# Patient Record
Sex: FEMALE | Race: WHITE | NOT HISPANIC OR LATINO
[De-identification: names, ages, dates, MRNs, and addresses within clinical notes are randomized per-mention and may not be internally consistent; named-entity substitution may affect disease eponyms.]

---

## 2017-03-02 ENCOUNTER — MESSAGE (OUTPATIENT)
Age: 77
End: 2017-03-02

## 2017-04-13 PROBLEM — E44.0 MALNUTRITION OF MODERATE DEGREE: Status: ACTIVE | Noted: 2017-03-02

## 2017-04-14 ENCOUNTER — APPOINTMENT (OUTPATIENT)
Dept: PULMONOLOGY | Facility: CLINIC | Age: 77
End: 2017-04-14

## 2017-04-14 VITALS
TEMPERATURE: 98.9 F | BODY MASS INDEX: 25.2 KG/M2 | WEIGHT: 125 LBS | SYSTOLIC BLOOD PRESSURE: 90 MMHG | HEIGHT: 59 IN | HEART RATE: 82 BPM | OXYGEN SATURATION: 96 % | DIASTOLIC BLOOD PRESSURE: 60 MMHG

## 2017-04-14 DIAGNOSIS — E44.0 MODERATE PROTEIN-CALORIE MALNUTRITION: ICD-10-CM

## 2017-04-14 DIAGNOSIS — E07.9 DISORDER OF THYROID, UNSPECIFIED: ICD-10-CM

## 2017-04-14 DIAGNOSIS — E55.9 VITAMIN D DEFICIENCY, UNSPECIFIED: ICD-10-CM

## 2017-04-14 DIAGNOSIS — M79.606 PAIN IN LEG, UNSPECIFIED: ICD-10-CM

## 2017-05-02 ENCOUNTER — APPOINTMENT (OUTPATIENT)
Dept: HEART AND VASCULAR | Facility: CLINIC | Age: 77
End: 2017-05-02

## 2017-05-02 ENCOUNTER — LABORATORY RESULT (OUTPATIENT)
Age: 77
End: 2017-05-02

## 2017-05-02 VITALS — HEART RATE: 80 BPM | SYSTOLIC BLOOD PRESSURE: 98 MMHG | DIASTOLIC BLOOD PRESSURE: 60 MMHG

## 2017-05-02 VITALS
DIASTOLIC BLOOD PRESSURE: 62 MMHG | HEART RATE: 81 BPM | HEIGHT: 59 IN | BODY MASS INDEX: 24.49 KG/M2 | WEIGHT: 121.5 LBS | SYSTOLIC BLOOD PRESSURE: 92 MMHG

## 2017-05-02 DIAGNOSIS — D72.829 ELEVATED WHITE BLOOD CELL COUNT, UNSPECIFIED: ICD-10-CM

## 2017-05-04 LAB
BASOPHILS # BLD AUTO: 0.02 K/UL
BASOPHILS NFR BLD AUTO: 0.1 %
EOSINOPHIL # BLD AUTO: 22.17 K/UL
EOSINOPHIL NFR BLD AUTO: 75.9 %
HCT VFR BLD CALC: 39.4 %
HGB BLD-MCNC: 13 G/DL
IMM GRANULOCYTES NFR BLD AUTO: 0.3 %
LYMPHOCYTES # BLD AUTO: 1.57 K/UL
LYMPHOCYTES NFR BLD AUTO: 5.4 %
MAN DIFF?: NORMAL
MCHC RBC-ENTMCNC: 33 GM/DL
MCHC RBC-ENTMCNC: 34 PG
MCV RBC AUTO: 103.1 FL
MONOCYTES # BLD AUTO: 0.4 K/UL
MONOCYTES NFR BLD AUTO: 1.4 %
NEUTROPHILS # BLD AUTO: 4.97 K/UL
NEUTROPHILS NFR BLD AUTO: 16.9 %
PLATELET # BLD AUTO: 258 K/UL
RBC # BLD: 3.82 M/UL
RBC # FLD: 15.3 %
WBC # FLD AUTO: 29.21 K/UL

## 2017-06-01 ENCOUNTER — RESULT REVIEW (OUTPATIENT)
Age: 77
End: 2017-06-01

## 2017-06-22 ENCOUNTER — MESSAGE (OUTPATIENT)
Age: 77
End: 2017-06-22

## 2017-07-11 ENCOUNTER — APPOINTMENT (OUTPATIENT)
Dept: HEART AND VASCULAR | Facility: CLINIC | Age: 77
End: 2017-07-11

## 2017-07-17 ENCOUNTER — OUTPATIENT (OUTPATIENT)
Dept: OUTPATIENT SERVICES | Facility: HOSPITAL | Age: 77
LOS: 1 days | End: 2017-07-17
Payer: MEDICARE

## 2017-07-17 LAB
BASOPHILS NFR BLD AUTO: 0 % — SIGNIFICANT CHANGE UP (ref 0–2)
EOSINOPHIL NFR BLD AUTO: 75.8 % — HIGH (ref 0–6)
ERYTHROCYTE [SEDIMENTATION RATE] IN BLOOD: 35 MM/HR — HIGH
HCT VFR BLD CALC: 38.2 % — SIGNIFICANT CHANGE UP (ref 34.5–45)
HGB BLD-MCNC: 13.1 G/DL — SIGNIFICANT CHANGE UP (ref 11.5–15.5)
LYMPHOCYTES # BLD AUTO: 4.9 % — LOW (ref 13–44)
MCHC RBC-ENTMCNC: 34.3 G/DL — SIGNIFICANT CHANGE UP (ref 32–36)
MCHC RBC-ENTMCNC: 34.4 PG — HIGH (ref 27–34)
MCV RBC AUTO: 100.3 FL — HIGH (ref 80–100)
MONOCYTES NFR BLD AUTO: 1 % — LOW (ref 2–14)
NEUTROPHILS NFR BLD AUTO: 18.3 % — LOW (ref 43–77)
PLATELET # BLD AUTO: 257 K/UL — SIGNIFICANT CHANGE UP (ref 150–400)
RBC # BLD: 3.81 M/UL — SIGNIFICANT CHANGE UP (ref 3.8–5.2)
RBC # FLD: 15 % — SIGNIFICANT CHANGE UP (ref 10.3–16.9)
WBC # BLD: 27.1 K/UL — HIGH (ref 3.8–10.5)
WBC # FLD AUTO: 27.1 K/UL — HIGH (ref 3.8–10.5)

## 2017-07-18 LAB
EOSINOPHIL NFR BLD AUTO: 75 % — HIGH (ref 0–6)
LYMPHOCYTES # BLD AUTO: 6 % — LOW (ref 13–44)
MACROCYTES BLD QL: SLIGHT — SIGNIFICANT CHANGE UP
MANUAL DIF COMMENT BLD-IMP: SIGNIFICANT CHANGE UP
MANUAL DIF COMMENT BLD-IMP: SIGNIFICANT CHANGE UP
MANUAL SMEAR VERIFICATION: SIGNIFICANT CHANGE UP
NEUTROPHILS NFR BLD AUTO: 19 % — LOW (ref 43–77)
PLAT MORPH BLD: NORMAL — SIGNIFICANT CHANGE UP
RBC BLD AUTO: (no result)

## 2017-07-24 DIAGNOSIS — D72.1 EOSINOPHILIA: ICD-10-CM

## 2017-07-24 DIAGNOSIS — D72.829 ELEVATED WHITE BLOOD CELL COUNT, UNSPECIFIED: ICD-10-CM

## 2017-07-24 DIAGNOSIS — R63.4 ABNORMAL WEIGHT LOSS: ICD-10-CM

## 2017-08-01 ENCOUNTER — OUTPATIENT (OUTPATIENT)
Dept: OUTPATIENT SERVICES | Facility: HOSPITAL | Age: 77
LOS: 1 days | End: 2017-08-01
Payer: MEDICARE

## 2017-08-01 LAB
ERYTHROCYTE [SEDIMENTATION RATE] IN BLOOD: 14 MM/HR — SIGNIFICANT CHANGE UP
HCT VFR BLD CALC: 39.5 % — SIGNIFICANT CHANGE UP (ref 34.5–45)
HGB BLD-MCNC: 13.1 G/DL — SIGNIFICANT CHANGE UP (ref 11.5–15.5)
MCHC RBC-ENTMCNC: 33.2 G/DL — SIGNIFICANT CHANGE UP (ref 32–36)
MCHC RBC-ENTMCNC: 33.8 PG — SIGNIFICANT CHANGE UP (ref 27–34)
MCV RBC AUTO: 101.8 FL — HIGH (ref 80–100)
PLATELET # BLD AUTO: 245 K/UL — SIGNIFICANT CHANGE UP (ref 150–400)
RBC # BLD: 3.88 M/UL — SIGNIFICANT CHANGE UP (ref 3.8–5.2)
RBC # FLD: 14.7 % — SIGNIFICANT CHANGE UP (ref 10.3–16.9)
WBC # BLD: 58.9 K/UL — CRITICAL HIGH (ref 3.8–10.5)
WBC # FLD AUTO: 58.9 K/UL — CRITICAL HIGH (ref 3.8–10.5)

## 2017-08-02 LAB
ANISOCYTOSIS BLD QL: SLIGHT — SIGNIFICANT CHANGE UP
EOSINOPHIL NFR BLD AUTO: 87 % — HIGH (ref 0–6)
LYMPHOCYTES # BLD AUTO: 4 % — LOW (ref 13–44)
MACROCYTES BLD QL: SLIGHT — SIGNIFICANT CHANGE UP
MANUAL DIF COMMENT BLD-IMP: SIGNIFICANT CHANGE UP
MANUAL DIF COMMENT BLD-IMP: SIGNIFICANT CHANGE UP
MANUAL SMEAR VERIFICATION: YES — SIGNIFICANT CHANGE UP
MONOCYTES NFR BLD AUTO: 1 % — LOW (ref 2–14)
NEUTROPHILS NFR BLD AUTO: 8 % — LOW (ref 43–77)
PLAT MORPH BLD: NORMAL — SIGNIFICANT CHANGE UP
RBC BLD AUTO: (no result)
TOXIC GRANULES BLD QL SMEAR: SLIGHT — SIGNIFICANT CHANGE UP

## 2017-08-03 DIAGNOSIS — D72.1 EOSINOPHILIA: ICD-10-CM

## 2017-08-03 DIAGNOSIS — D72.829 ELEVATED WHITE BLOOD CELL COUNT, UNSPECIFIED: ICD-10-CM

## 2017-08-03 DIAGNOSIS — R63.4 ABNORMAL WEIGHT LOSS: ICD-10-CM

## 2017-08-03 DIAGNOSIS — R19.7 DIARRHEA, UNSPECIFIED: ICD-10-CM

## 2017-08-07 ENCOUNTER — RESULT REVIEW (OUTPATIENT)
Age: 77
End: 2017-08-07

## 2017-08-08 ENCOUNTER — OUTPATIENT (OUTPATIENT)
Dept: OUTPATIENT SERVICES | Facility: HOSPITAL | Age: 77
LOS: 1 days | End: 2017-08-08
Payer: MEDICARE

## 2017-08-08 DIAGNOSIS — D72.829 ELEVATED WHITE BLOOD CELL COUNT, UNSPECIFIED: ICD-10-CM

## 2017-08-08 DIAGNOSIS — D72.1 EOSINOPHILIA: ICD-10-CM

## 2017-08-08 PROCEDURE — 88341 IMHCHEM/IMCYTCHM EA ADD ANTB: CPT

## 2017-08-08 PROCEDURE — 88305 TISSUE EXAM BY PATHOLOGIST: CPT

## 2017-08-08 PROCEDURE — 88313 SPECIAL STAINS GROUP 2: CPT

## 2017-08-08 PROCEDURE — 85097 BONE MARROW INTERPRETATION: CPT

## 2017-08-10 LAB — HEMATOPATHOLOGY REPORT: SIGNIFICANT CHANGE UP

## 2017-08-14 ENCOUNTER — OTHER (OUTPATIENT)
Age: 77
End: 2017-08-14

## 2017-08-14 DIAGNOSIS — Z86.2 PERSONAL HISTORY OF DISEASES OF THE BLOOD AND BLOOD-FORMING ORGANS AND CERTAIN DISORDERS INVOLVING THE IMMUNE MECHANISM: ICD-10-CM

## 2017-08-24 ENCOUNTER — APPOINTMENT (OUTPATIENT)
Dept: PULMONOLOGY | Facility: CLINIC | Age: 77
End: 2017-08-24
Payer: MEDICARE

## 2017-08-24 ENCOUNTER — FORM ENCOUNTER (OUTPATIENT)
Age: 77
End: 2017-08-24

## 2017-08-24 VITALS
HEART RATE: 93 BPM | DIASTOLIC BLOOD PRESSURE: 60 MMHG | SYSTOLIC BLOOD PRESSURE: 110 MMHG | BODY MASS INDEX: 24.6 KG/M2 | OXYGEN SATURATION: 97 % | HEIGHT: 59 IN | WEIGHT: 122 LBS | TEMPERATURE: 98.7 F

## 2017-08-24 PROCEDURE — 94010 BREATHING CAPACITY TEST: CPT

## 2017-08-24 PROCEDURE — 99215 OFFICE O/P EST HI 40 MIN: CPT | Mod: 25

## 2017-08-25 ENCOUNTER — OUTPATIENT (OUTPATIENT)
Dept: OUTPATIENT SERVICES | Facility: HOSPITAL | Age: 77
LOS: 1 days | End: 2017-08-25
Payer: MEDICARE

## 2017-08-25 PROCEDURE — 75561 CARDIAC MRI FOR MORPH W/DYE: CPT

## 2017-08-25 PROCEDURE — A9577: CPT

## 2017-08-25 PROCEDURE — 75561 CARDIAC MRI FOR MORPH W/DYE: CPT | Mod: 26

## 2017-08-27 ENCOUNTER — TRANSCRIPTION ENCOUNTER (OUTPATIENT)
Age: 77
End: 2017-08-27

## 2017-09-29 DIAGNOSIS — D72.119 HYPEREOSINOPHILIC SYNDROME [HES], UNSPECIFIED: ICD-10-CM

## 2017-09-29 DIAGNOSIS — D64.9 ANEMIA, UNSPECIFIED: ICD-10-CM

## 2017-10-03 PROBLEM — D64.9 ANEMIA: Status: ACTIVE | Noted: 2017-10-03

## 2017-10-09 ENCOUNTER — MEDICATION RENEWAL (OUTPATIENT)
Age: 77
End: 2017-10-09

## 2018-02-13 ENCOUNTER — APPOINTMENT (OUTPATIENT)
Dept: HEART AND VASCULAR | Facility: CLINIC | Age: 78
End: 2018-02-13
Payer: MEDICARE

## 2018-02-13 VITALS
BODY MASS INDEX: 28.07 KG/M2 | WEIGHT: 139 LBS | SYSTOLIC BLOOD PRESSURE: 130 MMHG | DIASTOLIC BLOOD PRESSURE: 80 MMHG | HEART RATE: 85 BPM

## 2018-02-13 VITALS — SYSTOLIC BLOOD PRESSURE: 130 MMHG | DIASTOLIC BLOOD PRESSURE: 78 MMHG | HEART RATE: 83 BPM

## 2018-02-13 PROCEDURE — 99214 OFFICE O/P EST MOD 30 MIN: CPT | Mod: 25

## 2018-02-13 PROCEDURE — 93000 ELECTROCARDIOGRAM COMPLETE: CPT

## 2018-02-13 PROCEDURE — 93306 TTE W/DOPPLER COMPLETE: CPT

## 2018-02-26 ENCOUNTER — FORM ENCOUNTER (OUTPATIENT)
Age: 78
End: 2018-02-26

## 2018-02-27 ENCOUNTER — OUTPATIENT (OUTPATIENT)
Dept: OUTPATIENT SERVICES | Facility: HOSPITAL | Age: 78
LOS: 1 days | End: 2018-02-27
Payer: MEDICARE

## 2018-02-27 DIAGNOSIS — R53.83 OTHER FATIGUE: ICD-10-CM

## 2018-02-27 PROCEDURE — 93016 CV STRESS TEST SUPVJ ONLY: CPT

## 2018-02-27 PROCEDURE — 93018 CV STRESS TEST I&R ONLY: CPT

## 2018-02-27 PROCEDURE — 78452 HT MUSCLE IMAGE SPECT MULT: CPT

## 2018-02-27 PROCEDURE — A9500: CPT

## 2018-02-27 PROCEDURE — 93017 CV STRESS TEST TRACING ONLY: CPT

## 2018-02-27 PROCEDURE — 78452 HT MUSCLE IMAGE SPECT MULT: CPT | Mod: 26

## 2018-02-27 PROCEDURE — A9505: CPT

## 2018-04-09 ENCOUNTER — RX RENEWAL (OUTPATIENT)
Age: 78
End: 2018-04-09

## 2018-04-20 ENCOUNTER — APPOINTMENT (OUTPATIENT)
Dept: PULMONOLOGY | Facility: CLINIC | Age: 78
End: 2018-04-20
Payer: MEDICARE

## 2018-04-20 VITALS
WEIGHT: 140 LBS | SYSTOLIC BLOOD PRESSURE: 120 MMHG | HEART RATE: 107 BPM | BODY MASS INDEX: 28.22 KG/M2 | DIASTOLIC BLOOD PRESSURE: 70 MMHG | TEMPERATURE: 99.3 F | HEIGHT: 59 IN | OXYGEN SATURATION: 92 %

## 2018-04-20 PROCEDURE — 94060 EVALUATION OF WHEEZING: CPT

## 2018-04-20 PROCEDURE — 71046 X-RAY EXAM CHEST 2 VIEWS: CPT

## 2018-04-20 PROCEDURE — 99215 OFFICE O/P EST HI 40 MIN: CPT | Mod: 25

## 2018-04-20 PROCEDURE — ZZZZZ: CPT

## 2018-04-20 PROCEDURE — 94727 GAS DIL/WSHOT DETER LNG VOL: CPT

## 2018-08-08 ENCOUNTER — APPOINTMENT (OUTPATIENT)
Dept: PULMONOLOGY | Facility: CLINIC | Age: 78
End: 2018-08-08
Payer: MEDICARE

## 2018-08-08 VITALS
DIASTOLIC BLOOD PRESSURE: 74 MMHG | HEART RATE: 87 BPM | SYSTOLIC BLOOD PRESSURE: 118 MMHG | OXYGEN SATURATION: 92 % | WEIGHT: 140 LBS | TEMPERATURE: 98.7 F | BODY MASS INDEX: 28.22 KG/M2 | HEIGHT: 59 IN

## 2018-08-08 PROCEDURE — 94010 BREATHING CAPACITY TEST: CPT

## 2018-08-08 PROCEDURE — 99214 OFFICE O/P EST MOD 30 MIN: CPT | Mod: 25

## 2018-08-08 PROCEDURE — 71046 X-RAY EXAM CHEST 2 VIEWS: CPT

## 2018-08-28 ENCOUNTER — APPOINTMENT (OUTPATIENT)
Dept: INFUSION THERAPY | Facility: HOSPITAL | Age: 78
End: 2018-08-28

## 2018-08-31 ENCOUNTER — OUTPATIENT (OUTPATIENT)
Dept: OUTPATIENT SERVICES | Facility: HOSPITAL | Age: 78
LOS: 1 days | End: 2018-08-31
Payer: MEDICARE

## 2018-08-31 ENCOUNTER — APPOINTMENT (OUTPATIENT)
Dept: INFUSION THERAPY | Facility: HOSPITAL | Age: 78
End: 2018-08-31

## 2018-08-31 VITALS
TEMPERATURE: 98 F | SYSTOLIC BLOOD PRESSURE: 122 MMHG | OXYGEN SATURATION: 95 % | HEART RATE: 85 BPM | DIASTOLIC BLOOD PRESSURE: 79 MMHG | RESPIRATION RATE: 16 BRPM

## 2018-08-31 DIAGNOSIS — J45.50 SEVERE PERSISTENT ASTHMA, UNCOMPLICATED: ICD-10-CM

## 2018-08-31 PROCEDURE — 96372 THER/PROPH/DIAG INJ SC/IM: CPT

## 2018-08-31 RX ORDER — BENRALIZUMAB 30 MG/ML
30 INJECTION, SOLUTION SUBCUTANEOUS ONCE
Qty: 0 | Refills: 0 | Status: COMPLETED | OUTPATIENT
Start: 2018-08-31 | End: 2018-08-31

## 2018-08-31 RX ADMIN — BENRALIZUMAB 30 MILLIGRAM(S): 30 INJECTION, SOLUTION SUBCUTANEOUS at 13:48

## 2018-09-28 ENCOUNTER — APPOINTMENT (OUTPATIENT)
Dept: INFUSION THERAPY | Facility: HOSPITAL | Age: 78
End: 2018-09-28

## 2018-09-28 ENCOUNTER — OUTPATIENT (OUTPATIENT)
Dept: OUTPATIENT SERVICES | Facility: HOSPITAL | Age: 78
LOS: 1 days | End: 2018-09-28
Payer: MEDICARE

## 2018-09-28 VITALS
HEART RATE: 77 BPM | OXYGEN SATURATION: 99 % | SYSTOLIC BLOOD PRESSURE: 110 MMHG | DIASTOLIC BLOOD PRESSURE: 70 MMHG | RESPIRATION RATE: 18 BRPM | TEMPERATURE: 98 F

## 2018-09-28 DIAGNOSIS — J45.50 SEVERE PERSISTENT ASTHMA, UNCOMPLICATED: ICD-10-CM

## 2018-09-28 PROCEDURE — 96372 THER/PROPH/DIAG INJ SC/IM: CPT

## 2018-09-28 RX ORDER — BENRALIZUMAB 30 MG/ML
30 INJECTION, SOLUTION SUBCUTANEOUS ONCE
Qty: 0 | Refills: 0 | Status: COMPLETED | OUTPATIENT
Start: 2018-09-28 | End: 2018-09-28

## 2018-09-28 RX ADMIN — BENRALIZUMAB 30 MILLIGRAM(S): 30 INJECTION, SOLUTION SUBCUTANEOUS at 13:50

## 2018-10-26 ENCOUNTER — APPOINTMENT (OUTPATIENT)
Dept: INFUSION THERAPY | Facility: HOSPITAL | Age: 78
End: 2018-10-26

## 2018-10-26 ENCOUNTER — OUTPATIENT (OUTPATIENT)
Dept: OUTPATIENT SERVICES | Facility: HOSPITAL | Age: 78
LOS: 1 days | End: 2018-10-26
Payer: MEDICARE

## 2018-10-26 VITALS
DIASTOLIC BLOOD PRESSURE: 56 MMHG | RESPIRATION RATE: 18 BRPM | HEART RATE: 77 BPM | TEMPERATURE: 98 F | OXYGEN SATURATION: 96 % | SYSTOLIC BLOOD PRESSURE: 127 MMHG

## 2018-10-26 DIAGNOSIS — J45.50 SEVERE PERSISTENT ASTHMA, UNCOMPLICATED: ICD-10-CM

## 2018-10-26 PROCEDURE — 96372 THER/PROPH/DIAG INJ SC/IM: CPT

## 2018-10-26 RX ORDER — BENRALIZUMAB 30 MG/ML
30 INJECTION, SOLUTION SUBCUTANEOUS ONCE
Qty: 0 | Refills: 0 | Status: COMPLETED | OUTPATIENT
Start: 2018-10-26 | End: 2018-10-26

## 2018-10-26 RX ADMIN — BENRALIZUMAB 30 MILLIGRAM(S): 30 INJECTION, SOLUTION SUBCUTANEOUS at 14:37

## 2018-12-24 ENCOUNTER — APPOINTMENT (OUTPATIENT)
Dept: INFUSION THERAPY | Facility: HOSPITAL | Age: 78
End: 2018-12-24

## 2018-12-24 ENCOUNTER — OUTPATIENT (OUTPATIENT)
Dept: OUTPATIENT SERVICES | Facility: HOSPITAL | Age: 78
LOS: 1 days | End: 2018-12-24
Payer: MEDICARE

## 2018-12-24 VITALS
DIASTOLIC BLOOD PRESSURE: 78 MMHG | HEART RATE: 90 BPM | OXYGEN SATURATION: 96 % | RESPIRATION RATE: 18 BRPM | SYSTOLIC BLOOD PRESSURE: 134 MMHG | TEMPERATURE: 97 F

## 2018-12-24 DIAGNOSIS — J45.50 SEVERE PERSISTENT ASTHMA, UNCOMPLICATED: ICD-10-CM

## 2018-12-24 PROCEDURE — 96372 THER/PROPH/DIAG INJ SC/IM: CPT

## 2018-12-24 RX ORDER — BENRALIZUMAB 30 MG/ML
30 INJECTION, SOLUTION SUBCUTANEOUS ONCE
Qty: 0 | Refills: 0 | Status: COMPLETED | OUTPATIENT
Start: 2018-12-24 | End: 2018-12-24

## 2018-12-24 RX ADMIN — BENRALIZUMAB 30 MILLIGRAM(S): 30 INJECTION, SOLUTION SUBCUTANEOUS at 13:34

## 2019-02-18 ENCOUNTER — FORM ENCOUNTER (OUTPATIENT)
Age: 79
End: 2019-02-18

## 2019-02-19 ENCOUNTER — OUTPATIENT (OUTPATIENT)
Dept: OUTPATIENT SERVICES | Facility: HOSPITAL | Age: 79
LOS: 1 days | End: 2019-02-19
Payer: MEDICARE

## 2019-02-19 ENCOUNTER — APPOINTMENT (OUTPATIENT)
Dept: ORTHOPEDIC SURGERY | Facility: CLINIC | Age: 79
End: 2019-02-19
Payer: MEDICARE

## 2019-02-19 ENCOUNTER — APPOINTMENT (OUTPATIENT)
Dept: INFUSION THERAPY | Facility: HOSPITAL | Age: 79
End: 2019-02-19

## 2019-02-19 VITALS
OXYGEN SATURATION: 98 % | DIASTOLIC BLOOD PRESSURE: 80 MMHG | BODY MASS INDEX: 28.63 KG/M2 | HEIGHT: 59 IN | HEART RATE: 84 BPM | SYSTOLIC BLOOD PRESSURE: 120 MMHG | WEIGHT: 142 LBS

## 2019-02-19 VITALS
RESPIRATION RATE: 16 BRPM | HEART RATE: 88 BPM | TEMPERATURE: 99 F | OXYGEN SATURATION: 96 % | SYSTOLIC BLOOD PRESSURE: 120 MMHG | DIASTOLIC BLOOD PRESSURE: 60 MMHG

## 2019-02-19 DIAGNOSIS — J45.50 SEVERE PERSISTENT ASTHMA, UNCOMPLICATED: ICD-10-CM

## 2019-02-19 PROCEDURE — 73564 X-RAY EXAM KNEE 4 OR MORE: CPT

## 2019-02-19 PROCEDURE — 99203 OFFICE O/P NEW LOW 30 MIN: CPT

## 2019-02-19 PROCEDURE — 96372 THER/PROPH/DIAG INJ SC/IM: CPT

## 2019-02-19 PROCEDURE — 73564 X-RAY EXAM KNEE 4 OR MORE: CPT | Mod: 26,50

## 2019-02-19 RX ORDER — BENRALIZUMAB 30 MG/ML
30 INJECTION, SOLUTION SUBCUTANEOUS ONCE
Qty: 0 | Refills: 0 | Status: COMPLETED | OUTPATIENT
Start: 2019-02-19 | End: 2019-02-19

## 2019-02-19 RX ORDER — ALBUTEROL SULFATE 90 UG/1
108 (90 BASE) AEROSOL, METERED RESPIRATORY (INHALATION)
Qty: 2 | Refills: 11 | Status: COMPLETED | COMMUNITY
Start: 2017-04-14 | End: 2019-02-19

## 2019-02-19 RX ADMIN — BENRALIZUMAB 30 MILLIGRAM(S): 30 INJECTION, SOLUTION SUBCUTANEOUS at 14:31

## 2019-02-28 NOTE — DISCUSSION/SUMMARY
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 She will begin a course of supervised PT. She will slowly advance her activities as tolerated. All questions were answered. She will call if any issues arise.

## 2019-02-28 NOTE — HISTORY OF PRESENT ILLNESS
[de-identified] : Bonnie Espinal is a pleasant 79 yo woman who presents with increased anterior left knee pain. 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 After a year of steroids, she finally tapered off the medication about three weeks ago. Around the same time she noted increasing left knee pain and stiffness. She has had difficulty walking and pain with stairs. She tried a course of Gabapentin as directed by her rheumatologist, but her pain only increased. She denies any previous knee issues. She reports persistent balance and gait issues. She denies any locking or buckling.

## 2019-02-28 NOTE — END OF VISIT
[FreeTextEntry3] : All medical record entries made by ASTRID Yoder, acting as a scribe for this encounter under the direction of George Velasquez MD . I have reviewed the chart and agree that the record accurately reflects my personal performance of the history, physical exam, assessment and plan. I have also personally directed, reviewed, and agreed with the chart.

## 2019-04-02 ENCOUNTER — TRANSCRIPTION ENCOUNTER (OUTPATIENT)
Age: 79
End: 2019-04-02

## 2019-04-16 ENCOUNTER — APPOINTMENT (OUTPATIENT)
Dept: INFUSION THERAPY | Facility: HOSPITAL | Age: 79
End: 2019-04-16

## 2019-04-16 ENCOUNTER — OUTPATIENT (OUTPATIENT)
Dept: OUTPATIENT SERVICES | Facility: HOSPITAL | Age: 79
LOS: 1 days | End: 2019-04-16
Payer: MEDICARE

## 2019-04-16 VITALS
TEMPERATURE: 99 F | DIASTOLIC BLOOD PRESSURE: 67 MMHG | SYSTOLIC BLOOD PRESSURE: 149 MMHG | HEART RATE: 89 BPM | RESPIRATION RATE: 18 BRPM | OXYGEN SATURATION: 99 %

## 2019-04-16 DIAGNOSIS — J45.50 SEVERE PERSISTENT ASTHMA, UNCOMPLICATED: ICD-10-CM

## 2019-04-16 PROCEDURE — 96372 THER/PROPH/DIAG INJ SC/IM: CPT

## 2019-04-16 RX ORDER — BENRALIZUMAB 30 MG/ML
30 INJECTION, SOLUTION SUBCUTANEOUS ONCE
Qty: 0 | Refills: 0 | Status: COMPLETED | OUTPATIENT
Start: 2019-04-16 | End: 2019-04-16

## 2019-04-16 RX ADMIN — BENRALIZUMAB 30 MILLIGRAM(S): 30 INJECTION, SOLUTION SUBCUTANEOUS at 14:43

## 2019-04-17 ENCOUNTER — RX RENEWAL (OUTPATIENT)
Age: 79
End: 2019-04-17

## 2019-04-22 ENCOUNTER — APPOINTMENT (OUTPATIENT)
Dept: PULMONOLOGY | Facility: CLINIC | Age: 79
End: 2019-04-22
Payer: MEDICARE

## 2019-04-22 VITALS
OXYGEN SATURATION: 97 % | HEIGHT: 59 IN | HEART RATE: 80 BPM | DIASTOLIC BLOOD PRESSURE: 70 MMHG | BODY MASS INDEX: 28.63 KG/M2 | SYSTOLIC BLOOD PRESSURE: 130 MMHG | WEIGHT: 142 LBS | TEMPERATURE: 98.5 F

## 2019-04-22 PROCEDURE — 71046 X-RAY EXAM CHEST 2 VIEWS: CPT

## 2019-04-22 PROCEDURE — 94010 BREATHING CAPACITY TEST: CPT

## 2019-04-22 PROCEDURE — 99215 OFFICE O/P EST HI 40 MIN: CPT | Mod: 25

## 2019-04-22 NOTE — DISCUSSION/SUMMARY
[FreeTextEntry1] : she is not symptomatic from the hernia, and she worries about the risk in a person her age, and she is not wrong at all, this would be difficult surgery\par \par low spirom is likley due to the hernia\par \par she will need a bone marrown since her peripheral count are "zero or negative",  not sure what the negative means.\par \par follow up in six months and then I"ll talk to the hematologist

## 2019-04-22 NOTE — PHYSICAL EXAM
[General Appearance - Well Developed] : well developed [Normal Appearance] : normal appearance [Well Groomed] : well groomed [General Appearance - Well Nourished] : well nourished [No Deformities] : no deformities [General Appearance - In No Acute Distress] : no acute distress [Normal Conjunctiva] : the conjunctiva exhibited no abnormalities [Eyelids - No Xanthelasma] : the eyelids demonstrated no xanthelasmas [Normal Oropharynx] : normal oropharynx [Neck Appearance] : the appearance of the neck was normal [Neck Cervical Mass (___cm)] : no neck mass was observed [Jugular Venous Distention Increased] : there was no jugular-venous distention [Thyroid Nodule] : there were no palpable thyroid nodules [Thyroid Diffuse Enlargement] : the thyroid was not enlarged [Heart Rate And Rhythm] : heart rate and rhythm were normal [Heart Sounds] : normal S1 and S2 [Murmurs] : no murmurs present [Respiration, Rhythm And Depth] : normal respiratory rhythm and effort [Exaggerated Use Of Accessory Muscles For Inspiration] : no accessory muscle use [Auscultation Breath Sounds / Voice Sounds] : lungs were clear to auscultation bilaterally [FreeTextEntry1] : breath sounds are clear despite decreased spirometry [Abnormal Walk] : normal gait [Nail Clubbing] : no clubbing of the fingernails [Gait - Sufficient For Exercise Testing] : the gait was sufficient for exercise testing [Petechial Hemorrhages (___cm)] : no petechial hemorrhages [Cyanosis, Localized] : no localized cyanosis [] : no ischemic changes

## 2019-04-22 NOTE — REVIEW OF SYSTEMS
[Fatigue] : fatigue [Dyspnea] : no dyspnea [Negative] : Neurologic [FreeTextEntry6] : wekaness  and knee pain

## 2019-04-22 NOTE — HISTORY OF PRESENT ILLNESS
[FreeTextEntry1] : patient is doing well in term of her hes, but she is very weak from the prednisone with orthopedic problems  she was however not on a very high dose of prednisone.  no sure exactly what the endpoint damage of the hes was, she looked fine to me even when she had that diagnosis and was no on treatment.

## 2019-04-22 NOTE — PROCEDURE
[FreeTextEntry1] : spirometry is mostly restricted\par \par chest film shows opacity in the left lung that is her praesophageal hernial\par \par ct scan from 2018 showed that her abdominal contents are largely in her chest

## 2019-04-25 ENCOUNTER — APPOINTMENT (OUTPATIENT)
Dept: ORTHOPEDIC SURGERY | Facility: CLINIC | Age: 79
End: 2019-04-25
Payer: MEDICARE

## 2019-04-25 VITALS
WEIGHT: 138 LBS | HEART RATE: 73 BPM | BODY MASS INDEX: 27.82 KG/M2 | DIASTOLIC BLOOD PRESSURE: 70 MMHG | HEIGHT: 59 IN | SYSTOLIC BLOOD PRESSURE: 120 MMHG | OXYGEN SATURATION: 97 %

## 2019-04-25 PROCEDURE — 20610 DRAIN/INJ JOINT/BURSA W/O US: CPT | Mod: LT

## 2019-04-25 PROCEDURE — 99213 OFFICE O/P EST LOW 20 MIN: CPT | Mod: 25

## 2019-05-02 NOTE — PHYSICAL EXAM
[ALL] : dorsalis pedis, posterior tibial, femoral, popliteal, and radial 2+ and symmetric bilaterally [de-identified] : The patient is a well developed, well nourished female in no apparent distress. She is alert and oriented X 3 with a pleasant mood and appropriate affect. \par \par On physical examination of the left knee, there is full range of motion. The patient walks with a slight limpot and stands in neutral alignment. There is trace effusion. No warmth or erythema is noted. The patella is tender to palpation medially and laterally. There is patellofemoral crepitus noted. The apprehension and grind tests are negative. The extensor mechanism is intact. There is no joint line tenderness. The Carin sign is absent. The Lachman and pivot shift tests are negative. There is no varus or valgus laxity at 0 or 30 degrees. No posterolateral or anteromedial laxity is noted. No masses are palpable. No other soft tissue or bony tenderness is noted. There is some tenderness noted on palpation of the IT band. Quadriceps weakness is noted. Neurovascular function is intact.   [Normal] : Oriented to person, place, and time, insight and judgement were intact and the affect was normal

## 2019-05-02 NOTE — PROCEDURE
[de-identified] : Under strict sterile technique, the left t knee was prepped with Betadine. Using the superolateral approach, with the patient supine, 1ml of Kenalog was mixed with 5mls of 1% Lidocaine and 5mLs of 0.5% Marcaine, and was injected intraarticularly. The patient tolerated the procedure well. The patient was instructed to avoid vigorous exercise for 24 hours and will apply ice to the knee for 20 minutes 2-3 times per day if discomfort occurs. Patient will return on an as needed basis. The patient will call if any questions or problems should arise

## 2019-05-02 NOTE — REVIEW OF SYSTEMS
[Joint Pain] : joint pain [Joint Stiffness] : joint stiffness [Joint Swelling] : joint swelling [Negative] : Psychiatric

## 2019-05-02 NOTE — DISCUSSION/SUMMARY
[de-identified] : Ms. Leal received a cortisone injection today. She will slowly increase her activities as tolerated. She will return to PT next week. All questions were answered. She will call if any issues arise.

## 2019-05-02 NOTE — HISTORY OF PRESENT ILLNESS
[de-identified] : Bonnie returns today for evaluation of her left knee. She had been making progress in PT until about a week ago. She developed increased anterior knee pain and swelling. She has difficulty walking and pain with stairs. She denies any locking or buckling.

## 2019-05-13 ENCOUNTER — RX RENEWAL (OUTPATIENT)
Age: 79
End: 2019-05-13

## 2019-05-14 ENCOUNTER — RX RENEWAL (OUTPATIENT)
Age: 79
End: 2019-05-14

## 2019-06-11 ENCOUNTER — OUTPATIENT (OUTPATIENT)
Dept: OUTPATIENT SERVICES | Facility: HOSPITAL | Age: 79
LOS: 1 days | End: 2019-06-11
Payer: MEDICARE

## 2019-06-11 ENCOUNTER — APPOINTMENT (OUTPATIENT)
Dept: INFUSION THERAPY | Facility: HOSPITAL | Age: 79
End: 2019-06-11

## 2019-06-11 VITALS
OXYGEN SATURATION: 95 % | TEMPERATURE: 97 F | HEART RATE: 77 BPM | RESPIRATION RATE: 18 BRPM | DIASTOLIC BLOOD PRESSURE: 66 MMHG | SYSTOLIC BLOOD PRESSURE: 104 MMHG

## 2019-06-11 DIAGNOSIS — J45.50 SEVERE PERSISTENT ASTHMA, UNCOMPLICATED: ICD-10-CM

## 2019-06-11 PROCEDURE — 96372 THER/PROPH/DIAG INJ SC/IM: CPT

## 2019-06-11 RX ORDER — BENRALIZUMAB 30 MG/ML
30 INJECTION, SOLUTION SUBCUTANEOUS ONCE
Refills: 0 | Status: COMPLETED | OUTPATIENT
Start: 2019-06-11 | End: 2019-06-11

## 2019-06-11 RX ADMIN — BENRALIZUMAB 30 MILLIGRAM(S): 30 INJECTION, SOLUTION SUBCUTANEOUS at 14:10

## 2019-08-07 ENCOUNTER — APPOINTMENT (OUTPATIENT)
Age: 79
End: 2019-08-07

## 2019-08-07 ENCOUNTER — OUTPATIENT (OUTPATIENT)
Dept: OUTPATIENT SERVICES | Facility: HOSPITAL | Age: 79
LOS: 1 days | End: 2019-08-07
Payer: MEDICARE

## 2019-08-07 VITALS
OXYGEN SATURATION: 96 % | DIASTOLIC BLOOD PRESSURE: 70 MMHG | HEIGHT: 58 IN | WEIGHT: 138.01 LBS | SYSTOLIC BLOOD PRESSURE: 108 MMHG | RESPIRATION RATE: 18 BRPM | HEART RATE: 79 BPM | TEMPERATURE: 99 F

## 2019-08-07 DIAGNOSIS — J45.50 SEVERE PERSISTENT ASTHMA, UNCOMPLICATED: ICD-10-CM

## 2019-08-07 PROCEDURE — 96372 THER/PROPH/DIAG INJ SC/IM: CPT

## 2019-08-07 RX ORDER — BENRALIZUMAB 30 MG/ML
30 INJECTION, SOLUTION SUBCUTANEOUS ONCE
Refills: 0 | Status: COMPLETED | OUTPATIENT
Start: 2019-08-07 | End: 2019-08-07

## 2019-08-07 RX ADMIN — BENRALIZUMAB 30 MILLIGRAM(S): 30 INJECTION, SOLUTION SUBCUTANEOUS at 14:25

## 2019-09-24 ENCOUNTER — APPOINTMENT (OUTPATIENT)
Dept: PULMONOLOGY | Facility: CLINIC | Age: 79
End: 2019-09-24
Payer: MEDICARE

## 2019-09-24 VITALS
SYSTOLIC BLOOD PRESSURE: 124 MMHG | OXYGEN SATURATION: 93 % | BODY MASS INDEX: 27.82 KG/M2 | DIASTOLIC BLOOD PRESSURE: 70 MMHG | WEIGHT: 138 LBS | HEART RATE: 94 BPM | TEMPERATURE: 98.6 F | HEIGHT: 59 IN

## 2019-09-24 PROCEDURE — 71046 X-RAY EXAM CHEST 2 VIEWS: CPT

## 2019-09-24 PROCEDURE — 99214 OFFICE O/P EST MOD 30 MIN: CPT | Mod: 25

## 2019-09-24 NOTE — PHYSICAL EXAM
[General Appearance - Well Developed] : well developed [Normal Appearance] : normal appearance [General Appearance - Well Nourished] : well nourished [Normal Conjunctiva] : the conjunctiva exhibited no abnormalities [II] : II [Normal Oropharynx] : normal oropharynx [Neck Appearance] : the appearance of the neck was normal [Jugular Venous Distention Increased] : there was no jugular-venous distention [Heart Rate And Rhythm] : heart rate and rhythm were normal [Heart Sounds] : normal S1 and S2 [Murmurs] : no murmurs present [Respiration, Rhythm And Depth] : normal respiratory rhythm and effort [Auscultation Breath Sounds / Voice Sounds] : lungs were clear to auscultation bilaterally [Abdomen Soft] : soft [Abdomen Tenderness] : non-tender [Abnormal Walk] : normal gait [Nail Clubbing] : no clubbing of the fingernails [Cyanosis, Localized] : no localized cyanosis [] : no rash [Skin Lesions] : no skin lesions

## 2019-09-26 NOTE — REVIEW OF SYSTEMS
[Fatigue] : fatigue [Nasal Congestion] : nasal congestion [Nasal Discharge] : nasal discharge [Fever] : no fever [Chills] : no chills [Postnasal Drip] : no postnasal drip [Sinus Problems] : no sinus problems [Cough] : no cough [Sputum] : not coughing up ~M sputum [Dyspnea] : no dyspnea [Chest Tightness] : no chest tightness [Pleuritic Pain] : no pleuritic pain [Hives] : no urticaria was observed [Angioedema] : no angioedema

## 2019-09-26 NOTE — ASSESSMENT
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 Jordan\par - Asthma well controlled, mild viral like illness today\par - Suspect worsening of FEV1 and FVC are due to the large hiatal hernia, referral given to Dr. Ortiz for evaluation of whether she is a candidate for intervention.

## 2019-09-26 NOTE — HISTORY OF PRESENT ILLNESS
[FreeTextEntry1] : Hypereosinophilia syndrome on Fesenra, presents for follow up. Complains of mild nasal congestion, but no SOB or cough, but feels fatigued since the last few days. No fever, chill, chest pain. Eosinophils last checked 4 months ago and WNL. Using flovent BID and arcapta daily.

## 2019-10-02 ENCOUNTER — OUTPATIENT (OUTPATIENT)
Dept: OUTPATIENT SERVICES | Facility: HOSPITAL | Age: 79
LOS: 1 days | End: 2019-10-02
Payer: MEDICARE

## 2019-10-02 ENCOUNTER — APPOINTMENT (OUTPATIENT)
Age: 79
End: 2019-10-02

## 2019-10-02 VITALS
RESPIRATION RATE: 18 BRPM | DIASTOLIC BLOOD PRESSURE: 65 MMHG | SYSTOLIC BLOOD PRESSURE: 93 MMHG | HEIGHT: 58 IN | HEART RATE: 79 BPM | WEIGHT: 139.99 LBS | OXYGEN SATURATION: 97 % | TEMPERATURE: 98 F

## 2019-10-02 DIAGNOSIS — J45.50 SEVERE PERSISTENT ASTHMA, UNCOMPLICATED: ICD-10-CM

## 2019-10-02 PROCEDURE — 96372 THER/PROPH/DIAG INJ SC/IM: CPT

## 2019-10-02 RX ORDER — BENRALIZUMAB 30 MG/ML
30 INJECTION, SOLUTION SUBCUTANEOUS ONCE
Refills: 0 | Status: COMPLETED | OUTPATIENT
Start: 2019-10-02 | End: 2019-10-02

## 2019-10-02 RX ADMIN — BENRALIZUMAB 30 MILLIGRAM(S): 30 INJECTION, SOLUTION SUBCUTANEOUS at 13:45

## 2019-10-04 ENCOUNTER — RX RENEWAL (OUTPATIENT)
Age: 79
End: 2019-10-04

## 2019-10-15 ENCOUNTER — APPOINTMENT (OUTPATIENT)
Dept: ORTHOPEDIC SURGERY | Facility: CLINIC | Age: 79
End: 2019-10-15
Payer: MEDICARE

## 2019-10-15 VITALS
HEIGHT: 59 IN | HEART RATE: 73 BPM | SYSTOLIC BLOOD PRESSURE: 120 MMHG | DIASTOLIC BLOOD PRESSURE: 70 MMHG | BODY MASS INDEX: 27.82 KG/M2 | WEIGHT: 138 LBS | OXYGEN SATURATION: 98 %

## 2019-10-15 PROCEDURE — 20610 DRAIN/INJ JOINT/BURSA W/O US: CPT | Mod: LT

## 2019-10-15 PROCEDURE — 99213 OFFICE O/P EST LOW 20 MIN: CPT | Mod: 25

## 2019-10-16 NOTE — END OF VISIT
[FreeTextEntry3] : All medical record entries made by ASTRID Yoder, acting as a scribe for this encounter under the direction of George Velasquez MD . I have reviewed the chart and agree that the record accurately reflects my personal performance of the history, physical exam, assessment and plan. I have also personally directed, reviewed, and agreed with the chart. \par \par

## 2019-10-16 NOTE — DISCUSSION/SUMMARY
[de-identified] : Bonnie received a cortisone injection today. She will restart Mobic 15mg daily for the next two weeks. She will begin a new round of supervised PT. she will return on an as needed basis. All question were answered. She will call if any issues arise.

## 2019-10-16 NOTE — HISTORY OF PRESENT ILLNESS
[de-identified] : Bonnie returns today for evaluation of her left knee. She reports a recent increase in anteiror knee pain and stiffness. She hit a plateau in PT and has been unable to exercise due to pain. She has had pain when rising from a seated position and pain with stairs. She denies any locking or buckling.

## 2019-10-16 NOTE — PROCEDURE
[de-identified] : Under strict sterile technique, the left  knee was prepped with Betadine. Using the superolateral approach, with the patient supine, 1ml of Kenalog was mixed with 5mls of 1% Lidocaine and 5mLs of 0.5% Marcaine, and was injected intraarticularly. The patient tolerated the procedure well. The patient was instructed to avoid vigorous exercise for 24 hours and will apply ice to the knee for 20 minutes 2-3 times per day if discomfort occurs. Patient will return on an as needed basis. The patient will call if any questions or problems should arise

## 2019-10-16 NOTE — PHYSICAL EXAM
[ALL] : dorsalis pedis, posterior tibial, femoral, popliteal, and radial 2+ and symmetric bilaterally [Normal] : Oriented to person, place, and time, insight and judgement were intact and the affect was normal [de-identified] : The patient is a well developed, well nourished female in no apparent distress. She is alert and oriented X 3 with a pleasant mood and appropriate affect. \par \par On physical examination of the left knee, there is full range of motion. The patient walks with a slight limp  and stands in neutral alignment. There is trace effusion. No warmth or erythema is noted. The patella is tender to palpation medially and laterally. There is patellofemoral crepitus noted. The apprehension and grind tests are negative. The extensor mechanism is intact. There is no joint line tenderness. The Carin sign is absent. The Lachman and pivot shift tests are negative. There is no varus or valgus laxity at 0 or 30 degrees. No posterolateral or anteromedial laxity is noted. No masses are palpable. No other soft tissue or bony tenderness is noted. There is some tenderness noted on palpation of the IT band. Quadriceps weakness is noted. Neurovascular function is intact.

## 2019-11-14 ENCOUNTER — RX RENEWAL (OUTPATIENT)
Age: 79
End: 2019-11-14

## 2019-11-26 ENCOUNTER — OUTPATIENT (OUTPATIENT)
Dept: OUTPATIENT SERVICES | Facility: HOSPITAL | Age: 79
LOS: 1 days | End: 2019-11-26
Payer: MEDICARE

## 2019-11-26 ENCOUNTER — APPOINTMENT (OUTPATIENT)
Dept: ORTHOPEDIC SURGERY | Facility: CLINIC | Age: 79
End: 2019-11-26
Payer: MEDICARE

## 2019-11-26 ENCOUNTER — APPOINTMENT (OUTPATIENT)
Age: 79
End: 2019-11-26

## 2019-11-26 VITALS
WEIGHT: 138.89 LBS | TEMPERATURE: 99 F | DIASTOLIC BLOOD PRESSURE: 75 MMHG | SYSTOLIC BLOOD PRESSURE: 122 MMHG | HEART RATE: 80 BPM | HEIGHT: 58 IN | OXYGEN SATURATION: 97 % | RESPIRATION RATE: 16 BRPM

## 2019-11-26 VITALS
WEIGHT: 137 LBS | SYSTOLIC BLOOD PRESSURE: 120 MMHG | HEIGHT: 59 IN | DIASTOLIC BLOOD PRESSURE: 70 MMHG | OXYGEN SATURATION: 98 % | HEART RATE: 103 BPM | BODY MASS INDEX: 27.62 KG/M2

## 2019-11-26 DIAGNOSIS — J45.50 SEVERE PERSISTENT ASTHMA, UNCOMPLICATED: ICD-10-CM

## 2019-11-26 PROCEDURE — 96372 THER/PROPH/DIAG INJ SC/IM: CPT

## 2019-11-26 PROCEDURE — 20610 DRAIN/INJ JOINT/BURSA W/O US: CPT | Mod: LT

## 2019-11-26 PROCEDURE — 99213 OFFICE O/P EST LOW 20 MIN: CPT | Mod: 25

## 2019-11-26 RX ORDER — BENRALIZUMAB 30 MG/ML
30 INJECTION, SOLUTION SUBCUTANEOUS ONCE
Refills: 0 | Status: COMPLETED | OUTPATIENT
Start: 2019-11-26 | End: 2019-11-26

## 2019-11-26 RX ADMIN — BENRALIZUMAB 30 MILLIGRAM(S): 30 INJECTION, SOLUTION SUBCUTANEOUS at 13:20

## 2019-12-05 NOTE — HISTORY OF PRESENT ILLNESS
[de-identified] : Bonnie returns today for evaluation of her left knee. She had only minor temporary improvement following the cortisone injection. She has persistent anteiror knee pain and stiffness. she has limited walking tolerance and ongoing pain with stairs. She denies any locking or buckling. She has been unable to resume PT due to ongoing pain.

## 2019-12-05 NOTE — DISCUSSION/SUMMARY
[de-identified] : Bonnie has had persistent knee pain and stiffness. She received a Monovisc injection today. She will slowly increase her activities as tolerated. We will see her back on an as needed basis. She will call if any issues arise.

## 2019-12-05 NOTE — PROCEDURE
[de-identified] : Under strict sterile technique, the left knee was prepped with Betadine. Using the superolateral approach, with the patient supine, a 4mL injection of Monovisc was administered intra-articularly. The patient tolerated the procedure well. The patient was instructed to avoid vigorous exercise for 48 hours and will apply ice to the knee for 20 minutes 2-3 times per day if discomfort occurs. Patient will return on an as needed basis. The patient will call if any questions or problems should arise. \par \par \par \par \par MonoVisc injection - Left knee joint\par Lot #: [id number]\par Exp: 04-\par Man: [employer]\par NDC: 46589-41106-1

## 2019-12-05 NOTE — PHYSICAL EXAM
[ALL] : dorsalis pedis, posterior tibial, femoral, popliteal, and radial 2+ and symmetric bilaterally [Normal] : Alert and in no acute distress [de-identified] : The patient is a well developed, well nourished female in no apparent distress. She is alert and oriented X 3 with a pleasant mood and appropriate affect. \par \par On physical examination of the left knee, there is full range of motion. The patient walks with a slight limp  and stands in neutral alignment. There is trace effusion. No warmth or erythema is noted. The patella is tender to palpation medially and laterally. There is patellofemoral crepitus noted. The apprehension and grind tests are negative. The extensor mechanism is intact. There is no joint line tenderness. The Carin sign is absent. The Lachman and pivot shift tests are negative. There is no varus or valgus laxity at 0 or 30 degrees. No posterolateral or anteromedial laxity is noted. No masses are palpable. No other soft tissue or bony tenderness is noted. There is some tenderness noted on palpation of the IT band. Quadriceps weakness is noted. Neurovascular function is intact.

## 2019-12-11 DIAGNOSIS — J45.909 UNSPECIFIED ASTHMA, UNCOMPLICATED: ICD-10-CM

## 2019-12-19 ENCOUNTER — APPOINTMENT (OUTPATIENT)
Dept: THORACIC SURGERY | Facility: CLINIC | Age: 79
End: 2019-12-19
Payer: MEDICARE

## 2019-12-19 VITALS
OXYGEN SATURATION: 95 % | BODY MASS INDEX: 26.61 KG/M2 | HEART RATE: 73 BPM | RESPIRATION RATE: 18 BRPM | HEIGHT: 59 IN | DIASTOLIC BLOOD PRESSURE: 63 MMHG | TEMPERATURE: 97.3 F | SYSTOLIC BLOOD PRESSURE: 124 MMHG | WEIGHT: 132 LBS

## 2019-12-19 PROCEDURE — 99203 OFFICE O/P NEW LOW 30 MIN: CPT

## 2019-12-20 NOTE — ADDENDUM
[FreeTextEntry1] : Documented by Shauna Ramirez acting as a scribe for Dr. Valdez Ortiz on 12/19/2019\par

## 2019-12-20 NOTE — HISTORY OF PRESENT ILLNESS
[FreeTextEntry1] : 78 y/o female, nonsmoker with a PMH of hypereosinophilic syndrome, CABG in 1999, hiatal hernia, asthma. She is being referred by Dr. Lees for surgical evaluation of her hiatal hernia. \par \par CT chest completed on 04/30/19:\par -large hiatal hernia with intrathoracic stomach demonstrating stable organoaxial volvulus, stable non obstructed transverse colon and mesenteric fat.\par -no evidence of gastric outlet obstruction\par -two stable subcentimeter pulmonary nodules\par -stable mildly dilated main pulmonary artery trunk, suggesting mild pulmonary arterial hypertension\par -significant coronary artery calcifications\par \par Spirometry done on 09/24/19 showed FEV1 = 0.73, 45% of predicted value, FVC = 0.97, 44% of predicted value, PEF = 2.61, 59% of predicted value \par

## 2019-12-20 NOTE — ASSESSMENT
[FreeTextEntry1] : 80 y/o female, nonsmoker with large hiatal hernia seen on CT chest. Patient currently modifies her diet by decreasing her portion sizes. She does admit to indigestion after eating, but this resolves after fifteen minuets as per patient. She also reports moderate hoarseness to her voice and her Rheumatologist said this may be related to underlying reflux. She denies chest pain, SOB, nausea or vomiting. She does admit to constipation for the past week, but admits this as out of the ordinary.  \par \par Patient does not feel that these symptoms are intruding on her life enough to go through surgery. She is extremely anxious about the potential for undergoing surgery. She explains that her sister passed after undergoing a cardiac surgery and she stressed that she does not have the social support with family or friends to help her post-operatively. Patient was crying and anxious during this office visit. \par \par I reviewed her recent CT chest from 4/30/19 with the patient. There is a large, Type IV hiatal hernia noted with the stomach and colon present in the chest. There is also large amounts of stool noted in the colon. I explained that due to the lapse in time I recommended a repeat CT chest and abdomen as well as barium esophagram. She is instructed to go to the ER for immediate evaluation if she develops abdominal pain, prolonged constipation, nausea or vomiting as this may be a sign of a SBO that will require emergency repair.  She was instructed to go immediately to the emergency room if symptoms develop.\par \par I also discussed with the patient that emergency surgery will come with a higher risk vs planned surgery. The planned surgery was discussed with the patient which entails an EGD, robotic-assisted, laparoscopic, hiatal hernia repair with partial fundoplication. \par \par The patient was counseled on the risks, benefits and alternatives of proceeding with hiatal hernia surgery.  Specifically, the risk of bleeding, need for a blood transfusion, conversion to an open procedure, solid organ injury, hollow viscus injury, possible need for mesh repair, possible need for fundoplication, possible need for gastropexy, as well as the risk of dysphagia, odynophagia, nausea, vomiting, delayed gastric emptying, DVT, pulmonary embolus, recurrent hernia, need for dietary modification, need for repeat surgery, as well as dysrhythmia, myocardial infarction and mortality was discussed with the patient, who understands and agrees to the above.\par \par Patient will return after the CT chest/abdomen as well as the esophagram to determine the plan of care. \par \par I have reviewed the patient's medical records and diagnostic images at the time of this office consultation and have made the following recommendation.\par Plan:\par 1. Barium esophagram\par 2. CT Chest and abdomen\par 3. RTO after above.

## 2019-12-20 NOTE — PHYSICAL EXAM
[General Appearance - Alert] : alert [] : no respiratory distress [Respiration, Rhythm And Depth] : normal respiratory rhythm and effort [Auscultation Breath Sounds / Voice Sounds] : lungs were clear to auscultation bilaterally [Apical Impulse] : the apical impulse was normal [Exaggerated Use Of Accessory Muscles For Inspiration] : no accessory muscle use [Heart Rate And Rhythm] : heart rate was normal and rhythm regular [Heart Sounds] : normal S1 and S2 [2+] : left 2+ [Abnormal Walk] : normal gait [Oriented To Time, Place, And Person] : oriented to person, place, and time

## 2019-12-20 NOTE — END OF VISIT
[FreeTextEntry3] : All medical record entries made by the Scribe were at my, Dr. Ortiz's direction and personally dictated by me on 12/19/2019 . I have reviewed the chart and agree that the record accurately reflects my personal performance of the history, physical exam, assessment and plan. I have also personally directed, reviewed, and agreed with the chart.\par

## 2020-01-21 ENCOUNTER — APPOINTMENT (OUTPATIENT)
Age: 80
End: 2020-01-21

## 2020-01-21 ENCOUNTER — OUTPATIENT (OUTPATIENT)
Dept: OUTPATIENT SERVICES | Facility: HOSPITAL | Age: 80
LOS: 1 days | End: 2020-01-21
Payer: MEDICARE

## 2020-01-21 VITALS
WEIGHT: 136.91 LBS | SYSTOLIC BLOOD PRESSURE: 147 MMHG | HEIGHT: 58 IN | DIASTOLIC BLOOD PRESSURE: 68 MMHG | TEMPERATURE: 97 F | RESPIRATION RATE: 16 BRPM | OXYGEN SATURATION: 98 % | HEART RATE: 75 BPM

## 2020-01-21 DIAGNOSIS — J45.909 UNSPECIFIED ASTHMA, UNCOMPLICATED: ICD-10-CM

## 2020-01-21 DIAGNOSIS — J45.50 SEVERE PERSISTENT ASTHMA, UNCOMPLICATED: ICD-10-CM

## 2020-01-21 PROCEDURE — 96372 THER/PROPH/DIAG INJ SC/IM: CPT

## 2020-01-21 RX ORDER — BENRALIZUMAB 30 MG/ML
30 INJECTION, SOLUTION SUBCUTANEOUS ONCE
Refills: 0 | Status: COMPLETED | OUTPATIENT
Start: 2020-01-21 | End: 2020-01-21

## 2020-01-21 RX ADMIN — BENRALIZUMAB 30 MILLIGRAM(S): 30 INJECTION, SOLUTION SUBCUTANEOUS at 14:29

## 2020-01-23 ENCOUNTER — APPOINTMENT (OUTPATIENT)
Dept: THORACIC SURGERY | Facility: CLINIC | Age: 80
End: 2020-01-23
Payer: MEDICARE

## 2020-01-23 VITALS
TEMPERATURE: 96.5 F | WEIGHT: 138 LBS | OXYGEN SATURATION: 94 % | SYSTOLIC BLOOD PRESSURE: 148 MMHG | HEIGHT: 59 IN | HEART RATE: 87 BPM | RESPIRATION RATE: 18 BRPM | BODY MASS INDEX: 27.82 KG/M2 | DIASTOLIC BLOOD PRESSURE: 70 MMHG

## 2020-01-23 PROCEDURE — 99214 OFFICE O/P EST MOD 30 MIN: CPT

## 2020-01-23 NOTE — PHYSICAL EXAM
[Sclera] : the sclera and conjunctiva were normal [PERRL With Normal Accommodation] : pupils were equal in size, round, and reactive to light [Neck Appearance] : the appearance of the neck was normal [] : no respiratory distress [Respiration, Rhythm And Depth] : normal respiratory rhythm and effort [Examination Of The Chest] : the chest was normal in appearance [2+] : right 2+ [Bowel Sounds] : normal bowel sounds [Abdomen Soft] : soft [Abnormal Walk] : normal gait [Musculoskeletal - Swelling] : no joint swelling seen [Skin Turgor] : normal skin turgor [Skin Color & Pigmentation] : normal skin color and pigmentation [Oriented To Time, Place, And Person] : oriented to person, place, and time [Impaired Insight] : insight and judgment were intact

## 2020-02-04 RX ORDER — PREDNISONE 20 MG/1
20 TABLET ORAL DAILY
Qty: 10 | Refills: 0 | Status: COMPLETED | COMMUNITY
Start: 2019-10-04 | End: 2020-02-04

## 2020-02-04 RX ORDER — MELOXICAM 15 MG/1
15 TABLET ORAL
Qty: 30 | Refills: 0 | Status: COMPLETED | COMMUNITY
Start: 2019-04-17 | End: 2020-02-04

## 2020-02-04 RX ORDER — MELOXICAM 15 MG/1
15 TABLET ORAL
Qty: 30 | Refills: 2 | Status: COMPLETED | COMMUNITY
Start: 2019-10-15 | End: 2020-02-04

## 2020-02-05 NOTE — END OF VISIT
[FreeTextEntry3] : All the medical record entries made by the Scribe were at my, Dr. Ortiz's direction and personally dictated by me on 01/23/2020. I have reviewed the chart and agree that the record accurately reflects my personal performance of the history, physical exam, assessment, and plan. I have also personally directed, reviewed and agreed with the chart.

## 2020-02-05 NOTE — ASSESSMENT
[FreeTextEntry1] : 78 y/o female, nonsmoker with a PMH of hypereosinophilic syndrome, CABG in 1999, hiatal hernia, asthma. She presents today to discuss the results of her recent barium esophagram and CT chest/abdomen. \par \par Patient is doing generally well. She denies pain, reflux, heartburn, and food sticking, but consumes small portions. She reports epigastric discomfort when eating larger meals. 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 We also reviewed patient's CT chest/abdomen completed on 12/3020 which demonstrates a large hiatal hernia. \par \par After reviewing all patient's testing, imaging, and notes from other physicians, I recommend patient undergo a EGD, laparopscopy, robotic-assisted, hiatal hernia repair with partial fundoplication, possible mesh and possible gastropexy. I explained to patient that if she does not repair the hernia, she will be at risk for  strangulation, and may need to undergo an emergency hernia repair which will carry greater risk then a scheduled repair. Patient is nervous about the operation and would like to consider it and let us know her decision. If she elects to have the surgery, she would like an anxiolytic morning of surgery.\par \par The patient was counseled on the risks, benefits and alternatives of proceeding with hiatal hernia surgery.  Specifically, the risk of bleeding, need for a blood transfusion, conversion to an open procedure, solid organ injury, hollow viscus injury, bloating, pain, dyspnea, tachycardia, as well as the risk of dysphagia, odynophagia, nausea, vomiting, delayed gastric emptying, DVT, pulmonary embolus, recurrent hernia, need for dietary modification, need for repeat surgery, as well as dysrhythmia, myocardial infarction and mortality was discussed with the patient, who understands and agrees to the above. I will speak to patient's cardiologist, Dr. Caitlyn Quigley, about moving forward with this operation.\par \par She is instructed to go to the ER for immediate evaluation if she develops abdominal pain, prolonged constipation, nausea or vomiting as this may be a sign of a SBO that will require emergency repair. \par \par I have reviewed the patient's medical records and diagnostic images at the time of this office consultation and have made the following recommendation.\par \par Plan:\par 1. Patient will consider hiatal hernia repair and then contact the office\par 2. Will schedule hiatal hernia repair, pending patient confirmation ( she will need medical and cardiac clearance, PST labs).

## 2020-02-05 NOTE — ADDENDUM
[FreeTextEntry1] : Documented by Franklin Chew acting as a scribe for Dr. Valdez Ortiz on 01/23/2020.\par \par

## 2020-02-05 NOTE — HISTORY OF PRESENT ILLNESS
[FreeTextEntry1] : 80 y/o female, nonsmoker with a PMH of hypereosinophilic syndrome, CABG in 1999, hiatal hernia, asthma. She presents today to discuss the results of her recent barium esophagram and CT chest/abdomen. She was referred by Dr. Lees for surgical evaluation of her hiatal hernia. \par \par CT chest completed on 04/30/19:\par -large hiatal hernia with intrathoracic stomach demonstrating stable organoaxial volvulus, stable non obstructed transverse colon and mesenteric fat.\par -no evidence of gastric outlet obstruction\par -two stable subcentimeter pulmonary nodules\par -stable mildly dilated main pulmonary artery trunk, suggesting mild pulmonary arterial hypertension\par -significant coronary artery calcifications\par \par Spirometry done on 09/24/19 showed FEV1 = 0.73, 45% of predicted value, FVC = 0.97, 44% of predicted value, PEF = 2.61, 59% of predicted value \par \par Barium Esophagram 01/07/20:\par -GERD to the upper esophagus in the BRAVO position\par -diffuse tertiary contractions are visualized in the esophagus most distally\par -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 \par \par CT chest/abdomen completed on 12/30/19:\par -hiatal hernia, large in size without significant interval change

## 2020-02-24 VITALS
HEIGHT: 59 IN | SYSTOLIC BLOOD PRESSURE: 148 MMHG | TEMPERATURE: 96 F | DIASTOLIC BLOOD PRESSURE: 70 MMHG | WEIGHT: 138.01 LBS | OXYGEN SATURATION: 98 % | RESPIRATION RATE: 18 BRPM | HEART RATE: 87 BPM

## 2020-02-24 RX ORDER — IRBESARTAN 75 MG/1
1 TABLET ORAL
Qty: 0 | Refills: 0 | DISCHARGE

## 2020-02-24 RX ORDER — SERTRALINE 25 MG/1
0 TABLET, FILM COATED ORAL
Qty: 0 | Refills: 0 | DISCHARGE

## 2020-02-24 RX ORDER — EZETIMIBE 10 MG/1
1 TABLET ORAL
Qty: 0 | Refills: 0 | DISCHARGE

## 2020-02-24 RX ORDER — ROSUVASTATIN CALCIUM 5 MG/1
1 TABLET ORAL
Qty: 0 | Refills: 0 | DISCHARGE

## 2020-02-24 NOTE — H&P ADULT - NSHPLABSRESULTS_GEN_ALL_CORE
CT chest completed on 04/30/19:  -large hiatal hernia with intrathoracic stomach demonstrating stable organoaxial volvulus, stable non obstructed transverse colon and mesenteric fat.  -no evidence of gastric outlet obstruction  -two stable subcentimeter pulmonary nodules  -stable mildly dilated main pulmonary artery trunk, suggesting mild pulmonary arterial hypertension  -significant coronary artery calcifications    Spirometry done on 09/24/19 showed FEV1 = 0.73, 45% of predicted value, FVC = 0.97, 44% of predicted value, PEF = 2.61, 59% of predicted value     CT chest/abdomen completed on 12/30/19:  -hiatal hernia, large in size without significant interval change     Barium Esophagram 01/07/20:  -GERD to the upper esophagus in the BRAVO position  -diffuse tertiary contractions are visualized in the esophagus most distally  -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

## 2020-02-24 NOTE — PATIENT PROFILE ADULT - VISION (WITH CORRECTIVE LENSES IF THE PATIENT USUALLY WEARS THEM):
Normal vision: sees adequately in most situations; can see medication labels, newsprint Normal vision: sees adequately in most situations; can see medication labels, newsprint/distant glasses

## 2020-02-24 NOTE — H&P ADULT - HISTORY OF PRESENT ILLNESS
80 y/o female, nonsmoker with a PMH of hypereosinophilic syndrome, CABG in 1999, hiatal hernia, asthma. She presents today to discuss the results of her recent barium esophagram and CT chest/abdomen. She was referred by Dr. Lees for surgical evaluation of her hiatal hernia.     CT chest completed on 04/30/19:  -large hiatal hernia with intrathoracic stomach demonstrating stable organoaxial volvulus, stable non obstructed transverse colon and mesenteric fat.  -no evidence of gastric outlet obstruction  -two stable subcentimeter pulmonary nodules  -stable mildly dilated main pulmonary artery trunk, suggesting mild pulmonary arterial hypertension  -significant coronary artery calcifications    Spirometry done on 09/24/19 showed FEV1 = 0.73, 45% of predicted value, FVC = 0.97, 44% of predicted value, PEF = 2.61, 59% of predicted value     CT chest/abdomen completed on 12/30/19:  -hiatal hernia, large in size without significant interval change     Barium Esophagram 01/07/20:  -GERD to the upper esophagus in the BRAVO position  -diffuse tertiary contractions are visualized in the esophagus most distally  -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 78 y/o female, nonsmoker with a PMH of hypereosinophilic syndrome, CABG in 1999, hiatal hernia, asthma. She presents today to discuss the results of her recent barium esophagram and CT chest/abdomen. She was referred by Dr. Lees for surgical evaluation of her hiatal hernia.     CT chest completed on 04/30/19:  -large hiatal hernia with intrathoracic stomach demonstrating stable organoaxial volvulus, stable non obstructed transverse colon and mesenteric fat.  -no evidence of gastric outlet obstruction  -two stable subcentimeter pulmonary nodules  -stable mildly dilated main pulmonary artery trunk, suggesting mild pulmonary arterial hypertension  -significant coronary artery calcifications    Spirometry done on 09/24/19 showed FEV1 = 0.73, 45% of predicted value, FVC = 0.97, 44% of predicted value, PEF = 2.61, 59% of predicted value     CT chest/abdomen completed on 12/30/19:  -hiatal hernia, large in size without significant interval change     Barium Esophagram 01/07/20:  -GERD to the upper esophagus in the BRAVO position  -diffuse tertiary contractions are visualized in the esophagus most distally  -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     Ms. Leal seen today in preop holding. She reports being in her usual state of health. Procedure was discussed and all questions answered. Consent was signed. She denies fever, chills, nausea, vomiting, headache or diarrhea. She has been NPO since midnight. She stopped her ASA one week ago. Plan to proceed with EGD, laparopscopy, robotic-assisted, hiatal hernia repair with partial fundoplication, possible mesh and possible gastropexy today.

## 2020-02-24 NOTE — PATIENT PROFILE ADULT - ...
RTW from completed and signed by Dr Bandar Price.   Given to [facility] for processing 24-Feb-2020 12:23:42

## 2020-02-25 ENCOUNTER — INPATIENT (INPATIENT)
Facility: HOSPITAL | Age: 80
LOS: 5 days | Discharge: HOME HEALTH | DRG: 327 | End: 2020-03-02
Attending: SPECIALIST | Admitting: SPECIALIST
Payer: MEDICARE

## 2020-02-25 ENCOUNTER — APPOINTMENT (OUTPATIENT)
Dept: THORACIC SURGERY | Facility: HOSPITAL | Age: 80
End: 2020-02-25

## 2020-02-25 DIAGNOSIS — Z98.890 OTHER SPECIFIED POSTPROCEDURAL STATES: Chronic | ICD-10-CM

## 2020-02-25 DIAGNOSIS — Z95.1 PRESENCE OF AORTOCORONARY BYPASS GRAFT: Chronic | ICD-10-CM

## 2020-02-25 LAB
ANION GAP SERPL CALC-SCNC: 11 MMOL/L — SIGNIFICANT CHANGE UP (ref 5–17)
APTT BLD: 29.3 SEC — SIGNIFICANT CHANGE UP (ref 27.5–36.3)
BLD GP AB SCN SERPL QL: NEGATIVE — SIGNIFICANT CHANGE UP
BUN SERPL-MCNC: 15 MG/DL — SIGNIFICANT CHANGE UP (ref 7–23)
CALCIUM SERPL-MCNC: 9 MG/DL — SIGNIFICANT CHANGE UP (ref 8.4–10.5)
CHLORIDE SERPL-SCNC: 106 MMOL/L — SIGNIFICANT CHANGE UP (ref 96–108)
CO2 SERPL-SCNC: 22 MMOL/L — SIGNIFICANT CHANGE UP (ref 22–31)
CREAT SERPL-MCNC: 0.68 MG/DL — SIGNIFICANT CHANGE UP (ref 0.5–1.3)
GLUCOSE SERPL-MCNC: 185 MG/DL — HIGH (ref 70–99)
HCT VFR BLD CALC: 44.4 % — SIGNIFICANT CHANGE UP (ref 34.5–45)
HGB BLD-MCNC: 14.5 G/DL — SIGNIFICANT CHANGE UP (ref 11.5–15.5)
INR BLD: 0.97 — SIGNIFICANT CHANGE UP (ref 0.88–1.16)
MCHC RBC-ENTMCNC: 32.7 GM/DL — SIGNIFICANT CHANGE UP (ref 32–36)
MCHC RBC-ENTMCNC: 33.5 PG — SIGNIFICANT CHANGE UP (ref 27–34)
MCV RBC AUTO: 102.5 FL — HIGH (ref 80–100)
NRBC # BLD: 0 /100 WBCS — SIGNIFICANT CHANGE UP (ref 0–0)
PLATELET # BLD AUTO: 191 K/UL — SIGNIFICANT CHANGE UP (ref 150–400)
POTASSIUM SERPL-MCNC: 4.3 MMOL/L — SIGNIFICANT CHANGE UP (ref 3.5–5.3)
POTASSIUM SERPL-SCNC: 4.3 MMOL/L — SIGNIFICANT CHANGE UP (ref 3.5–5.3)
PROTHROM AB SERPL-ACNC: 11.1 SEC — SIGNIFICANT CHANGE UP (ref 10–12.9)
RBC # BLD: 4.33 M/UL — SIGNIFICANT CHANGE UP (ref 3.8–5.2)
RBC # FLD: 13.2 % — SIGNIFICANT CHANGE UP (ref 10.3–14.5)
RH IG SCN BLD-IMP: POSITIVE — SIGNIFICANT CHANGE UP
SODIUM SERPL-SCNC: 139 MMOL/L — SIGNIFICANT CHANGE UP (ref 135–145)
WBC # BLD: 21.14 K/UL — HIGH (ref 3.8–10.5)
WBC # FLD AUTO: 21.14 K/UL — HIGH (ref 3.8–10.5)

## 2020-02-25 PROCEDURE — 43282 LAP PARAESOPH HER RPR W/MESH: CPT

## 2020-02-25 PROCEDURE — S2900 ROBOTIC SURGICAL SYSTEM: CPT | Mod: NC

## 2020-02-25 PROCEDURE — 71045 X-RAY EXAM CHEST 1 VIEW: CPT | Mod: 26

## 2020-02-25 PROCEDURE — 39561 RESECT DIAPHRAGM COMPLEX: CPT

## 2020-02-25 PROCEDURE — 43235 EGD DIAGNOSTIC BRUSH WASH: CPT

## 2020-02-25 RX ORDER — BENRALIZUMAB 30 MG/ML
0 INJECTION, SOLUTION SUBCUTANEOUS
Qty: 0 | Refills: 0 | DISCHARGE

## 2020-02-25 RX ORDER — VANCOMYCIN HCL 1 G
1000 VIAL (EA) INTRAVENOUS EVERY 12 HOURS
Refills: 0 | Status: COMPLETED | OUTPATIENT
Start: 2020-02-25 | End: 2020-02-26

## 2020-02-25 RX ORDER — ALBUTEROL 90 UG/1
0 AEROSOL, METERED ORAL
Qty: 0 | Refills: 0 | DISCHARGE

## 2020-02-25 RX ORDER — ALBUTEROL 90 UG/1
2 AEROSOL, METERED ORAL EVERY 6 HOURS
Refills: 0 | Status: DISCONTINUED | OUTPATIENT
Start: 2020-02-25 | End: 2020-03-02

## 2020-02-25 RX ORDER — SERTRALINE 25 MG/1
25 TABLET, FILM COATED ORAL DAILY
Refills: 0 | Status: DISCONTINUED | OUTPATIENT
Start: 2020-02-25 | End: 2020-02-25

## 2020-02-25 RX ORDER — IRBESARTAN 75 MG/1
0 TABLET ORAL
Qty: 0 | Refills: 0 | DISCHARGE

## 2020-02-25 RX ORDER — PANTOPRAZOLE SODIUM 20 MG/1
40 TABLET, DELAYED RELEASE ORAL DAILY
Refills: 0 | Status: DISCONTINUED | OUTPATIENT
Start: 2020-02-25 | End: 2020-02-26

## 2020-02-25 RX ORDER — ASPIRIN/CALCIUM CARB/MAGNESIUM 324 MG
0 TABLET ORAL
Qty: 0 | Refills: 0 | DISCHARGE

## 2020-02-25 RX ORDER — PANTOPRAZOLE SODIUM 20 MG/1
40 TABLET, DELAYED RELEASE ORAL
Refills: 0 | Status: DISCONTINUED | OUTPATIENT
Start: 2020-02-25 | End: 2020-02-25

## 2020-02-25 RX ORDER — BUPIVACAINE 13.3 MG/ML
20 INJECTION, SUSPENSION, LIPOSOMAL INFILTRATION ONCE
Refills: 0 | Status: DISCONTINUED | OUTPATIENT
Start: 2020-02-25 | End: 2020-02-26

## 2020-02-25 RX ORDER — SODIUM CHLORIDE 9 MG/ML
1000 INJECTION, SOLUTION INTRAVENOUS
Refills: 0 | Status: DISCONTINUED | OUTPATIENT
Start: 2020-02-25 | End: 2020-02-26

## 2020-02-25 RX ORDER — EZETIMIBE 10 MG/1
1 TABLET ORAL
Qty: 0 | Refills: 0 | DISCHARGE

## 2020-02-25 RX ORDER — INDACATEROL MALEATE 75 UG/1
1 CAPSULE ORAL; RESPIRATORY (INHALATION)
Qty: 0 | Refills: 0 | DISCHARGE

## 2020-02-25 RX ORDER — LIDOCAINE 4 G/100G
1 CREAM TOPICAL DAILY
Refills: 0 | Status: DISCONTINUED | OUTPATIENT
Start: 2020-02-25 | End: 2020-03-02

## 2020-02-25 RX ORDER — ACETAMINOPHEN 500 MG
1000 TABLET ORAL ONCE
Refills: 0 | Status: COMPLETED | OUTPATIENT
Start: 2020-02-25 | End: 2020-02-25

## 2020-02-25 RX ORDER — HEPARIN SODIUM 5000 [USP'U]/ML
5000 INJECTION INTRAVENOUS; SUBCUTANEOUS EVERY 8 HOURS
Refills: 0 | Status: DISCONTINUED | OUTPATIENT
Start: 2020-02-25 | End: 2020-03-02

## 2020-02-25 RX ORDER — SERTRALINE 25 MG/1
0.5 TABLET, FILM COATED ORAL
Qty: 0 | Refills: 0 | DISCHARGE

## 2020-02-25 RX ORDER — FLUTICASONE PROPIONATE 220 MCG
0 AEROSOL WITH ADAPTER (GRAM) INHALATION
Qty: 0 | Refills: 0 | DISCHARGE

## 2020-02-25 RX ORDER — ACETAMINOPHEN 500 MG
650 TABLET ORAL EVERY 6 HOURS
Refills: 0 | Status: DISCONTINUED | OUTPATIENT
Start: 2020-02-25 | End: 2020-02-25

## 2020-02-25 RX ORDER — ROSUVASTATIN CALCIUM 5 MG/1
1 TABLET ORAL
Qty: 0 | Refills: 0 | DISCHARGE

## 2020-02-25 RX ADMIN — Medication 250 MILLIGRAM(S): at 21:40

## 2020-02-25 RX ADMIN — SODIUM CHLORIDE 50 MILLILITER(S): 9 INJECTION, SOLUTION INTRAVENOUS at 12:24

## 2020-02-25 RX ADMIN — LIDOCAINE 1 PATCH: 4 CREAM TOPICAL at 19:00

## 2020-02-25 RX ADMIN — PANTOPRAZOLE SODIUM 40 MILLIGRAM(S): 20 TABLET, DELAYED RELEASE ORAL at 12:27

## 2020-02-25 RX ADMIN — HEPARIN SODIUM 5000 UNIT(S): 5000 INJECTION INTRAVENOUS; SUBCUTANEOUS at 14:02

## 2020-02-25 RX ADMIN — LIDOCAINE 1 PATCH: 4 CREAM TOPICAL at 23:58

## 2020-02-25 RX ADMIN — HEPARIN SODIUM 5000 UNIT(S): 5000 INJECTION INTRAVENOUS; SUBCUTANEOUS at 21:39

## 2020-02-25 RX ADMIN — LIDOCAINE 1 PATCH: 4 CREAM TOPICAL at 12:27

## 2020-02-25 RX ADMIN — Medication 400 MILLIGRAM(S): at 23:57

## 2020-02-25 NOTE — PROGRESS NOTE ADULT - ASSESSMENT
79 year old F, nonsmoker, PMHx hypereosinophilic syndrome, CABG in 1999, hiatal hernia, asthma. She was referred by Dr. Lees for surgical evaluation of her hiatal hernia. On 2/25/20 patient underwent laparoscopic RA hiatal hernia repair with mesh. Seen and examined in PACU.    A/P:  Neurovascular: No delirium. Pain well controlled with current regimen.  -IV tylenol 5pm  -Lidoderm patch    Cardiovascular: Hemodynamically stable. HR controlled.  -Hx HTN and CABG in 1999  -Continue statin/asa/ACE once taking PO  -Monitor hr/bp/tele     Respiratory: 02 Sat = 98% on RA.  -Encourage ambulation, C+DB and Use of IS 10x / hr while awake.  -CXR- with no ptx, no pleural effusion   -continue home nebs     GI: Stable. s/p procedure above  -IV protonic for GI PPX.  -IVF  -NPO until further notice by Dr. Ortiz     Renal / : BUN/Cr 15/0.68  -Monitor renal function.  -Monitor I/O's.  -Replete lytes PRN  - + newman    Endocrine:  No Hx  -f/u A1c/TSH.    Hematologic: H&H 14/44  -f/u AM CBC    ID: Afebrile, WBC 21  -complete periop abx  -Observe for SIRS/Sepsis Syndrome.    Prophylaxis:  -DVT prophylaxis with 5000 SubQ Heparin q8h.  -SCD's    Disposition:  -to [facility]

## 2020-02-25 NOTE — H&P ADULT - ASSESSMENT
Yes
80 y/o female, nonsmoker with a PMH of hypereosinophilic syndrome, CABG in 1999, hiatal hernia, asthma. She presents today to discuss the results of her recent barium esophagram and CT chest/abdomen.     Patient is doing generally well. She denies pain, reflux, heartburn, and food sticking, but consumes small portions. She reports epigastric discomfort when eating larger meals.     I reviewed the barium esophagram study completed on 1/7/20 with patient and her friend (nephrologist at [facility]) demonstrating GERD to the upper esophagus in the BRAVO position, diffuse tertiary contractions are visualized in the esophagus most distally, 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 We also reviewed patient's CT chest/abdomen completed on 12/3020 which demonstrates a large hiatal hernia.     After reviewing all patient's testing, imaging, and notes from other physicians, I recommend patient undergo a EGD, laparopscopy, robotic-assisted, hiatal hernia repair with partial fundoplication, possible mesh and possible gastropexy. I explained to patient that if she does not repair the hernia, she will be at risk for strangulation, and may need to undergo an emergency hernia repair which will carry greater risk then a scheduled repair. Patient is nervous about the operation and would like to consider it and let us know her decision. If she elects to have the surgery, she would like an anxiolytic morning of surgery.    The patient was counseled on the risks, benefits and alternatives of proceeding with hiatal hernia surgery. Specifically, the risk of bleeding, need for a blood transfusion, conversion to an open procedure, solid organ injury, hollow viscus injury, bloating, pain, dyspnea, tachycardia, as well as the risk of dysphagia, odynophagia, nausea, vomiting, delayed gastric emptying, DVT, pulmonary embolus, recurrent hernia, need for dietary modification, need for repeat surgery, as well as dysrhythmia, myocardial infarction and mortality was discussed with the patient, who understands and agrees to the above. I will speak to patient's cardiologist, Dr. Caitlyn Quigley, about moving forward with this operation.    She is instructed to go to the ER for immediate evaluation if she develops abdominal pain, prolonged constipation, nausea or vomiting as this may be a sign of a SBO that will require emergency repair.     I have reviewed the patient's medical records and diagnostic images at the time of this office consultation and have made the following recommendation.  EGD, Laparoscopic, robotic assisted repair of hiatal hernia, possible fundoplication, possible mesh, possible gastropexy 02/25/20

## 2020-02-25 NOTE — PACU DISCHARGE NOTE - COMMENTS
Patient meets criteria for patient discharge, A&Ox4, Soreness controlled with hot packs,, VSS, #22 to left wrist receiving LRS @ 50mkl/hr, 6X Lap sites to abdomen are clean and dry with derma Carrillo, No drainage, No hematoma, No swelling, abdomen soft with no distention, #16 F Ford in place draining clear yellow urine, Patient NPO, patient is moving all extremities without numbness or pain, No SOB or respiratory anomalies, No chest pain,  Report given to Jocy on [facility], patient transported on bed with monitor.

## 2020-02-25 NOTE — PROGRESS NOTE ADULT - SUBJECTIVE AND OBJECTIVE BOX
Post op acceptance note     Operation / Date: 2/25/20 Hiatal hernia repair with mesh    SUBJECTIVE ASSESSMENT:  79y Female seen and examined. Feels well offers no acute complaints at this time. Denies fever, chest pain, palpitations, SOB, abdominal pain, n/v.     Vital Signs Last 24 Hrs  T(C): 36.4 (25 Feb 2020 11:40), Max: 36.4 (25 Feb 2020 11:40)  T(F): 97.6 (25 Feb 2020 11:40), Max: 97.6 (25 Feb 2020 11:40)  HR: 100 (25 Feb 2020 12:10) (98 - 102)  BP: 125/59 (25 Feb 2020 12:10) (120/59 - 133/63)  BP(mean): 84 (25 Feb 2020 12:10) (84 - 91)  RR: 17 (25 Feb 2020 12:10) (17 - 21)  SpO2: 98% (25 Feb 2020 12:10) (98% - 99%)  I&O's Detail    25 Feb 2020 07:01  -  25 Feb 2020 12:40  --------------------------------------------------------  IN:    lactated ringers.: 112 mL  Total IN: 112 mL    OUT:    Indwelling Catheter - Urethral: 80 mL  Total OUT: 80 mL    Total NET: 32 mL      CHEST TUBE:  No  MARICARMEN DRAIN:  no.  EPICARDIAL WIRES: No.  TIE DOWNS: No.  MORRISON: Yes    PHYSICAL EXAM:    General: Patient lying comfortably in bed, no acute distress     Neurological: Alert and oriented. No focal neurological deficits     Cardiovascular: S1S2, RRR, no murmurs appreciated on exam     Respiratory: Clear to ausculation bilaterally, no wheeze/rhonchi/rales    Gastrointestinal: + BS, soft, non tender, non distended     Extremities: Warm and well perfused. No edema, no calf tenderness     Vascular: 2+ Peripheral pulses b/l     Incision Sites: Lap sites; CDI.     LABS:                        14.5   21.14 )-----------( 191      ( 25 Feb 2020 12:17 )             44.4       COUMADIN: no    MEDICATIONS  (STANDING):  BUpivacaine liposome 1.3% Injectable (no eMAR) 20 milliLiter(s) Local Injection once  heparin  Injectable 5000 Unit(s) SubCutaneous every 8 hours  lactated ringers. 1000 milliLiter(s) (50 mL/Hr) IV Continuous <Continuous>  lidocaine   Patch 1 Patch Transdermal daily  pantoprazole  Injectable 40 milliGRAM(s) IV Push daily  sertraline 25 milliGRAM(s) Oral daily  vancomycin  IVPB 1000 milliGRAM(s) IV Intermittent every 12 hours    MEDICATIONS  (PRN):  acetaminophen   Tablet .. 650 milliGRAM(s) Oral every 6 hours PRN Mild Pain (1 - 3)  acetaminophen  IVPB .. 1000 milliGRAM(s) IV Intermittent once PRN Mild Pain (1 - 3)  ALBUTerol    90 MICROgram(s) HFA Inhaler 2 Puff(s) Inhalation every 6 hours PRN Shortness of Breath and/or Wheezing        RADIOLOGY & ADDITIONAL TESTS:

## 2020-02-25 NOTE — BRIEF OPERATIVE NOTE - NSICDXBRIEFPROCEDURE_GEN_ALL_CORE_FT
PROCEDURES:  Repair, hiatal hernia, laparoscopic, using mesh 25-Feb-2020 11:51:05 EGD, laprascopic, robotic assisted, Type 4 hiatal hernia repair with relaxed incision and mesh Hillary Murillo

## 2020-02-26 LAB
ANION GAP SERPL CALC-SCNC: 12 MMOL/L — SIGNIFICANT CHANGE UP (ref 5–17)
BUN SERPL-MCNC: 14 MG/DL — SIGNIFICANT CHANGE UP (ref 7–23)
CALCIUM SERPL-MCNC: 9.4 MG/DL — SIGNIFICANT CHANGE UP (ref 8.4–10.5)
CHLORIDE SERPL-SCNC: 108 MMOL/L — SIGNIFICANT CHANGE UP (ref 96–108)
CO2 SERPL-SCNC: 23 MMOL/L — SIGNIFICANT CHANGE UP (ref 22–31)
CREAT SERPL-MCNC: 0.67 MG/DL — SIGNIFICANT CHANGE UP (ref 0.5–1.3)
GLUCOSE SERPL-MCNC: 112 MG/DL — HIGH (ref 70–99)
HCT VFR BLD CALC: 41.7 % — SIGNIFICANT CHANGE UP (ref 34.5–45)
HGB BLD-MCNC: 13.8 G/DL — SIGNIFICANT CHANGE UP (ref 11.5–15.5)
MAGNESIUM SERPL-MCNC: 1.7 MG/DL — SIGNIFICANT CHANGE UP (ref 1.6–2.6)
MCHC RBC-ENTMCNC: 33.1 GM/DL — SIGNIFICANT CHANGE UP (ref 32–36)
MCHC RBC-ENTMCNC: 33.4 PG — SIGNIFICANT CHANGE UP (ref 27–34)
MCV RBC AUTO: 101 FL — HIGH (ref 80–100)
NRBC # BLD: 0 /100 WBCS — SIGNIFICANT CHANGE UP (ref 0–0)
PLATELET # BLD AUTO: 143 K/UL — LOW (ref 150–400)
POTASSIUM SERPL-MCNC: 4.4 MMOL/L — SIGNIFICANT CHANGE UP (ref 3.5–5.3)
POTASSIUM SERPL-SCNC: 4.4 MMOL/L — SIGNIFICANT CHANGE UP (ref 3.5–5.3)
RBC # BLD: 4.13 M/UL — SIGNIFICANT CHANGE UP (ref 3.8–5.2)
RBC # FLD: 13.1 % — SIGNIFICANT CHANGE UP (ref 10.3–14.5)
SODIUM SERPL-SCNC: 143 MMOL/L — SIGNIFICANT CHANGE UP (ref 135–145)
WBC # BLD: 13.45 K/UL — HIGH (ref 3.8–10.5)
WBC # FLD AUTO: 13.45 K/UL — HIGH (ref 3.8–10.5)

## 2020-02-26 PROCEDURE — 71045 X-RAY EXAM CHEST 1 VIEW: CPT | Mod: 26,77

## 2020-02-26 PROCEDURE — 71045 X-RAY EXAM CHEST 1 VIEW: CPT | Mod: 26

## 2020-02-26 PROCEDURE — 76937 US GUIDE VASCULAR ACCESS: CPT | Mod: 26

## 2020-02-26 RX ORDER — ASPIRIN/CALCIUM CARB/MAGNESIUM 324 MG
81 TABLET ORAL DAILY
Refills: 0 | Status: DISCONTINUED | OUTPATIENT
Start: 2020-02-26 | End: 2020-03-02

## 2020-02-26 RX ORDER — ACETAMINOPHEN 500 MG
650 TABLET ORAL EVERY 6 HOURS
Refills: 0 | Status: DISCONTINUED | OUTPATIENT
Start: 2020-02-26 | End: 2020-03-02

## 2020-02-26 RX ORDER — BUDESONIDE AND FORMOTEROL FUMARATE DIHYDRATE 160; 4.5 UG/1; UG/1
2 AEROSOL RESPIRATORY (INHALATION)
Refills: 0 | Status: DISCONTINUED | OUTPATIENT
Start: 2020-02-26 | End: 2020-03-02

## 2020-02-26 RX ORDER — ATORVASTATIN CALCIUM 80 MG/1
40 TABLET, FILM COATED ORAL AT BEDTIME
Refills: 0 | Status: DISCONTINUED | OUTPATIENT
Start: 2020-02-26 | End: 2020-03-02

## 2020-02-26 RX ORDER — SENNA PLUS 8.6 MG/1
2 TABLET ORAL AT BEDTIME
Refills: 0 | Status: DISCONTINUED | OUTPATIENT
Start: 2020-02-26 | End: 2020-03-02

## 2020-02-26 RX ORDER — KETOROLAC TROMETHAMINE 30 MG/ML
15 SYRINGE (ML) INJECTION ONCE
Refills: 0 | Status: DISCONTINUED | OUTPATIENT
Start: 2020-02-26 | End: 2020-02-26

## 2020-02-26 RX ORDER — SERTRALINE 25 MG/1
12.5 TABLET, FILM COATED ORAL DAILY
Refills: 0 | Status: DISCONTINUED | OUTPATIENT
Start: 2020-02-26 | End: 2020-03-02

## 2020-02-26 RX ORDER — POLYETHYLENE GLYCOL 3350 17 G/17G
17 POWDER, FOR SOLUTION ORAL DAILY
Refills: 0 | Status: DISCONTINUED | OUTPATIENT
Start: 2020-02-26 | End: 2020-03-02

## 2020-02-26 RX ORDER — MAGNESIUM OXIDE 400 MG ORAL TABLET 241.3 MG
800 TABLET ORAL ONCE
Refills: 0 | Status: COMPLETED | OUTPATIENT
Start: 2020-02-26 | End: 2020-02-26

## 2020-02-26 RX ORDER — PANTOPRAZOLE SODIUM 20 MG/1
40 TABLET, DELAYED RELEASE ORAL
Refills: 0 | Status: DISCONTINUED | OUTPATIENT
Start: 2020-02-26 | End: 2020-03-02

## 2020-02-26 RX ORDER — LOSARTAN POTASSIUM 100 MG/1
50 TABLET, FILM COATED ORAL DAILY
Refills: 0 | Status: DISCONTINUED | OUTPATIENT
Start: 2020-02-26 | End: 2020-03-02

## 2020-02-26 RX ADMIN — LIDOCAINE 1 PATCH: 4 CREAM TOPICAL at 19:08

## 2020-02-26 RX ADMIN — PANTOPRAZOLE SODIUM 40 MILLIGRAM(S): 20 TABLET, DELAYED RELEASE ORAL at 12:39

## 2020-02-26 RX ADMIN — HEPARIN SODIUM 5000 UNIT(S): 5000 INJECTION INTRAVENOUS; SUBCUTANEOUS at 14:27

## 2020-02-26 RX ADMIN — POLYETHYLENE GLYCOL 3350 17 GRAM(S): 17 POWDER, FOR SOLUTION ORAL at 14:27

## 2020-02-26 RX ADMIN — BUDESONIDE AND FORMOTEROL FUMARATE DIHYDRATE 2 PUFF(S): 160; 4.5 AEROSOL RESPIRATORY (INHALATION) at 19:28

## 2020-02-26 RX ADMIN — HEPARIN SODIUM 5000 UNIT(S): 5000 INJECTION INTRAVENOUS; SUBCUTANEOUS at 06:53

## 2020-02-26 RX ADMIN — Medication 250 MILLIGRAM(S): at 12:39

## 2020-02-26 RX ADMIN — HEPARIN SODIUM 5000 UNIT(S): 5000 INJECTION INTRAVENOUS; SUBCUTANEOUS at 22:24

## 2020-02-26 RX ADMIN — Medication 15 MILLIGRAM(S): at 07:15

## 2020-02-26 RX ADMIN — Medication 0.5 MILLIGRAM(S): at 15:00

## 2020-02-26 RX ADMIN — ATORVASTATIN CALCIUM 40 MILLIGRAM(S): 80 TABLET, FILM COATED ORAL at 22:24

## 2020-02-26 RX ADMIN — LIDOCAINE 1 PATCH: 4 CREAM TOPICAL at 12:39

## 2020-02-26 RX ADMIN — LOSARTAN POTASSIUM 50 MILLIGRAM(S): 100 TABLET, FILM COATED ORAL at 14:27

## 2020-02-26 RX ADMIN — MAGNESIUM OXIDE 400 MG ORAL TABLET 800 MILLIGRAM(S): 241.3 TABLET ORAL at 07:59

## 2020-02-26 RX ADMIN — Medication 81 MILLIGRAM(S): at 14:27

## 2020-02-26 RX ADMIN — SERTRALINE 12.5 MILLIGRAM(S): 25 TABLET, FILM COATED ORAL at 14:27

## 2020-02-26 RX ADMIN — Medication 1000 MILLIGRAM(S): at 00:15

## 2020-02-26 RX ADMIN — Medication 15 MILLIGRAM(S): at 06:53

## 2020-02-26 NOTE — PROGRESS NOTE ADULT - SUBJECTIVE AND OBJECTIVE BOX
Patient discussed on morning rounds with Dr. Walker    Operation / Date: 2/25/2020 EGD, laparoscopic, robotic assisted, Type 4 hiatal hernia repair with relaxed incision and mesh    SUBJECTIVE ASSESSMENT:  79y Female assessed at bedside today. The patient states she feels well and that her pain is well controlled. She denies nausea, vomiting. She endorses some belching. She states she had liquids for breakfast that she tolerated well with no regurgitation. She denies chest pain, SOB. She continues to use IS (500 ml).     Vital Signs Last 24 Hrs  T(C): 36.7 (26 Feb 2020 13:18), Max: 37.3 (25 Feb 2020 16:12)  T(F): 98 (26 Feb 2020 13:18), Max: 99.2 (25 Feb 2020 16:12)  HR: 92 (26 Feb 2020 12:45) (90 - 100)  BP: 180/79 (26 Feb 2020 12:45) (114/65 - 180/79)  BP(mean): 113 (26 Feb 2020 12:45) (86 - 113)  RR: 19 (26 Feb 2020 12:45) (15 - 21)  SpO2: 94% (26 Feb 2020 12:45) (94% - 97%)  I&O's Detail    25 Feb 2020 07:01  -  26 Feb 2020 07:00  --------------------------------------------------------  IN:    IV PiggyBack: 100 mL    lactated ringers.: 862 mL  Total IN: 962 mL    OUT:    Indwelling Catheter - Urethral: 880 mL  Total OUT: 880 mL    Total NET: 82 mL      26 Feb 2020 07:01  -  26 Feb 2020 14:26  --------------------------------------------------------  IN:  Total IN: 0 mL    OUT:    Indwelling Catheter - Urethral: 125 mL  Total OUT: 125 mL    Total NET: -125 mL    CHEST TUBE:  No  MARICARMEN DRAIN:  No.  EPICARDIAL WIRES: No.  TIE DOWNS: No.  MORRISON: No.    PHYSICAL EXAM:  General: Well appearing, in NAD assessed sitting comfortably at bedside  Neurological: A&OX3, no focal deficits noted. Moving bilateral upper and lower extremities.   Cardiovascular: RRR, no murmurs, rubs, gallops  Respiratory: Clear to auscultation bilateral posterior lung fields. No wheezes, rales, rhonchi.  Gastrointestinal: +BS, NT/ND  Extremities: No peripheral edema, no calf tenderness. Full strength and ROM bilateral upper and lower extremities    Vascular: Bilateral distal pulses 2+  Incision Sites: Laparoscopy incisions clean, dry, intact, with no discharge.       LABS:                        13.8   13.45 )-----------( 143      ( 26 Feb 2020 05:58 )             41.7       COUMADIN:  No    PT/INR - ( 25 Feb 2020 13:20 )   PT: 11.1 sec;   INR: 0.97          PTT - ( 25 Feb 2020 13:20 )  PTT:29.3 sec    02-26    143  |  108  |  14  ----------------------------<  112<H>  4.4   |  23  |  0.67    Ca    9.4      26 Feb 2020 05:58  Mg     1.7     02-26      MEDICATIONS  (STANDING):  aspirin enteric coated 81 milliGRAM(s) Oral daily  atorvastatin 40 milliGRAM(s) Oral at bedtime  budesonide  80 MICROgram(s)/formoterol 4.5 MICROgram(s) Inhaler 2 Puff(s) Inhalation two times a day  heparin  Injectable 5000 Unit(s) SubCutaneous every 8 hours  lidocaine   Patch 1 Patch Transdermal daily  losartan 50 milliGRAM(s) Oral daily  pantoprazole    Tablet 40 milliGRAM(s) Oral before breakfast  polyethylene glycol 3350 17 Gram(s) Oral daily  senna 2 Tablet(s) Oral at bedtime  sertraline 12.5 milliGRAM(s) Oral daily    MEDICATIONS  (PRN):  acetaminophen   Tablet .. 650 milliGRAM(s) Oral every 6 hours PRN Mild Pain (1 - 3)  ALBUTerol    90 MICROgram(s) HFA Inhaler 2 Puff(s) Inhalation every 6 hours PRN Shortness of Breath and/or Wheezing  LORazepam     Tablet 0.5 milliGRAM(s) Oral daily PRN Anxiety    RADIOLOGY & ADDITIONAL TESTS:  < from: Xray Chest 1 View-PORTABLE IMMEDIATE (02.26.20 @ 13:16) >  IMPRESSION: Right midline catheter appropriately positioned, with tip in the distal right axillary/proximal right subclavian vein. No other interval changes.    A/P:    Ms. Leal is a 79 y.o female, nonsmoker, with a PMHx of hypereosinophilic syndrome, HTN, CABG (1999), hiatal hernia, and asthma who underwent a CT chest/abdomen and barium esophagram and was referred by Dr. Lees to Dr. Ortiz for surgical evaluation of her hiatal hernia. Her CT showed a large hiatal hernia with intrathoracic stomach demonstrating stable organoaxial volvulus. Her barium esophagram demonstrated chronic organoaxial gastric volvulus. She presented on 2/25/20 for an elective hiatal hernia repair with relaxed incision and mesh with Dr. Ortiz. Her procedure was uncomplicated and she was transferred to [facility] from the PACU postoperatively. POD1 the patient remains stable on the floor and is tolerating clear liquid diet. Her home PO meds were restarted today.     Neurovascular:   - No delirium. Pain well controlled with current regimen.  -Continue tylenol PRN for pain control  - Continue ativan 0.5 mg daily PRN for anxiety  - Continue sertraline 12.5 mg daily for depression    Cardiovascular:   -Hemodynamically stable. HR controlled ()  - Hx of HTN, BP controlled (114//79). Home medications started today (losartan) after elevation in SBP to 180.   - Hx of CABG, continue ASA  - Hx of HLD, continue atorvastatin   - Midline placed today after could no obtain peripheral access     Respiratory:   - 02 Sat = 94% on RA.  -Encourage C+DB and Use of IS 10x / hr while awake.  -CXR with no effusions, consolidations, PTX  - Hx of asthma, continue proair and symbicort    GI:   - Stable.  -Continue GI PPX with protonix  -PO Diet: clear liquids     Renal / :  - BUN/Cr stable at 14/0.67  -Continue to monitor renal function.  -Monitor I/O's.  - Replete electrolytes PRN    Endocrine:    - No known hx of diabetes  - No known hx of thyroid disease     Hematologic:  -H/H stable at 13.8/41.7  with no obvious signs of bleeding  -Coagulation Panel.    ID:  -Pt remains afebrile with no elevation in WBC or signs of infection  -Continue to monitor CBC  -Observe for SIRS/Sepsis Syndrome.    Prophylaxis:  -DVT prophylaxis with 5000 SubQ Heparin q8h.  -SCD's    Disposition:  -Home when medically appropriate.

## 2020-02-26 NOTE — PROCEDURE NOTE - NSPROCDETAILS_GEN_ALL_CORE
sterile dressing applied/sterile technique, catheter placed/ultrasound guidance/location identified, draped/prepped, sterile technique used

## 2020-02-27 LAB
ANION GAP SERPL CALC-SCNC: 11 MMOL/L — SIGNIFICANT CHANGE UP (ref 5–17)
BUN SERPL-MCNC: 11 MG/DL — SIGNIFICANT CHANGE UP (ref 7–23)
CALCIUM SERPL-MCNC: 8.7 MG/DL — SIGNIFICANT CHANGE UP (ref 8.4–10.5)
CHLORIDE SERPL-SCNC: 107 MMOL/L — SIGNIFICANT CHANGE UP (ref 96–108)
CO2 SERPL-SCNC: 22 MMOL/L — SIGNIFICANT CHANGE UP (ref 22–31)
CREAT SERPL-MCNC: 0.51 MG/DL — SIGNIFICANT CHANGE UP (ref 0.5–1.3)
GLUCOSE SERPL-MCNC: 108 MG/DL — HIGH (ref 70–99)
HBA1C BLD-MCNC: 5.2 % — SIGNIFICANT CHANGE UP (ref 4–5.6)
HCT VFR BLD CALC: 42.3 % — SIGNIFICANT CHANGE UP (ref 34.5–45)
HGB BLD-MCNC: 13.5 G/DL — SIGNIFICANT CHANGE UP (ref 11.5–15.5)
MAGNESIUM SERPL-MCNC: 1.6 MG/DL — SIGNIFICANT CHANGE UP (ref 1.6–2.6)
MCHC RBC-ENTMCNC: 31.9 GM/DL — LOW (ref 32–36)
MCHC RBC-ENTMCNC: 32.8 PG — SIGNIFICANT CHANGE UP (ref 27–34)
MCV RBC AUTO: 102.7 FL — HIGH (ref 80–100)
NRBC # BLD: 0 /100 WBCS — SIGNIFICANT CHANGE UP (ref 0–0)
PLATELET # BLD AUTO: 124 K/UL — LOW (ref 150–400)
POTASSIUM SERPL-MCNC: 4.5 MMOL/L — SIGNIFICANT CHANGE UP (ref 3.5–5.3)
POTASSIUM SERPL-SCNC: 4.5 MMOL/L — SIGNIFICANT CHANGE UP (ref 3.5–5.3)
RBC # BLD: 4.12 M/UL — SIGNIFICANT CHANGE UP (ref 3.8–5.2)
RBC # FLD: 13.3 % — SIGNIFICANT CHANGE UP (ref 10.3–14.5)
SODIUM SERPL-SCNC: 140 MMOL/L — SIGNIFICANT CHANGE UP (ref 135–145)
TSH SERPL-MCNC: 2.15 UIU/ML — SIGNIFICANT CHANGE UP (ref 0.35–4.94)
WBC # BLD: 10.03 K/UL — SIGNIFICANT CHANGE UP (ref 3.8–10.5)
WBC # FLD AUTO: 10.03 K/UL — SIGNIFICANT CHANGE UP (ref 3.8–10.5)

## 2020-02-27 PROCEDURE — 76604 US EXAM CHEST: CPT | Mod: 26

## 2020-02-27 PROCEDURE — 71045 X-RAY EXAM CHEST 1 VIEW: CPT | Mod: 26

## 2020-02-27 RX ORDER — ACETAMINOPHEN 500 MG
500 TABLET ORAL ONCE
Refills: 0 | Status: COMPLETED | OUTPATIENT
Start: 2020-02-27 | End: 2020-02-27

## 2020-02-27 RX ORDER — KETOROLAC TROMETHAMINE 30 MG/ML
15 SYRINGE (ML) INJECTION ONCE
Refills: 0 | Status: DISCONTINUED | OUTPATIENT
Start: 2020-02-27 | End: 2020-02-27

## 2020-02-27 RX ORDER — MAGNESIUM OXIDE 400 MG ORAL TABLET 241.3 MG
800 TABLET ORAL ONCE
Refills: 0 | Status: COMPLETED | OUTPATIENT
Start: 2020-02-27 | End: 2020-02-27

## 2020-02-27 RX ORDER — ACETAMINOPHEN 500 MG
325 TABLET ORAL ONCE
Refills: 0 | Status: COMPLETED | OUTPATIENT
Start: 2020-02-27 | End: 2020-02-27

## 2020-02-27 RX ADMIN — Medication 15 MILLIGRAM(S): at 09:28

## 2020-02-27 RX ADMIN — Medication 500 MILLIGRAM(S): at 22:35

## 2020-02-27 RX ADMIN — BUDESONIDE AND FORMOTEROL FUMARATE DIHYDRATE 2 PUFF(S): 160; 4.5 AEROSOL RESPIRATORY (INHALATION) at 06:10

## 2020-02-27 RX ADMIN — Medication 30 MILLILITER(S): at 10:51

## 2020-02-27 RX ADMIN — POLYETHYLENE GLYCOL 3350 17 GRAM(S): 17 POWDER, FOR SOLUTION ORAL at 12:58

## 2020-02-27 RX ADMIN — LIDOCAINE 1 PATCH: 4 CREAM TOPICAL at 18:53

## 2020-02-27 RX ADMIN — Medication 325 MILLIGRAM(S): at 18:35

## 2020-02-27 RX ADMIN — SERTRALINE 12.5 MILLIGRAM(S): 25 TABLET, FILM COATED ORAL at 15:57

## 2020-02-27 RX ADMIN — MAGNESIUM OXIDE 400 MG ORAL TABLET 800 MILLIGRAM(S): 241.3 TABLET ORAL at 09:28

## 2020-02-27 RX ADMIN — Medication 500 MILLIGRAM(S): at 21:36

## 2020-02-27 RX ADMIN — BUDESONIDE AND FORMOTEROL FUMARATE DIHYDRATE 2 PUFF(S): 160; 4.5 AEROSOL RESPIRATORY (INHALATION) at 18:34

## 2020-02-27 RX ADMIN — Medication 81 MILLIGRAM(S): at 12:58

## 2020-02-27 RX ADMIN — PANTOPRAZOLE SODIUM 40 MILLIGRAM(S): 20 TABLET, DELAYED RELEASE ORAL at 07:13

## 2020-02-27 RX ADMIN — SENNA PLUS 2 TABLET(S): 8.6 TABLET ORAL at 21:37

## 2020-02-27 RX ADMIN — Medication 15 MILLIGRAM(S): at 10:00

## 2020-02-27 RX ADMIN — LOSARTAN POTASSIUM 50 MILLIGRAM(S): 100 TABLET, FILM COATED ORAL at 05:42

## 2020-02-27 RX ADMIN — ATORVASTATIN CALCIUM 40 MILLIGRAM(S): 80 TABLET, FILM COATED ORAL at 21:37

## 2020-02-27 RX ADMIN — LIDOCAINE 1 PATCH: 4 CREAM TOPICAL at 12:58

## 2020-02-27 RX ADMIN — HEPARIN SODIUM 5000 UNIT(S): 5000 INJECTION INTRAVENOUS; SUBCUTANEOUS at 21:37

## 2020-02-27 RX ADMIN — HEPARIN SODIUM 5000 UNIT(S): 5000 INJECTION INTRAVENOUS; SUBCUTANEOUS at 15:00

## 2020-02-27 RX ADMIN — HEPARIN SODIUM 5000 UNIT(S): 5000 INJECTION INTRAVENOUS; SUBCUTANEOUS at 05:42

## 2020-02-27 RX ADMIN — LIDOCAINE 1 PATCH: 4 CREAM TOPICAL at 00:29

## 2020-02-27 NOTE — PROGRESS NOTE ADULT - SUBJECTIVE AND OBJECTIVE BOX
Patient discussed on morning rounds with Dr. Ortiz    Operation / Date: 2/25/2020 - EGD, laprascopic, RA, type 4 hiatal hernia repair with relaxed incision and mesh     SUBJECTIVE ASSESSMENT:  Pt is feeling well this morning but complains of right shoulder pain. Otherwise pt is without complaints. Tolerating a clear liquid diet well, moved her bowels this morning, ambulates without difficulty and is using her IS regularly. Denies any CP, palpitations, SOB, wheezing, abd pain, n/v/d/c, fevers or chills.     Vital Signs Last 24 Hrs  T(C): 37.3 (27 Feb 2020 09:42), Max: 37.3 (27 Feb 2020 09:42)  T(F): 99.2 (27 Feb 2020 09:42), Max: 99.2 (27 Feb 2020 09:42)  HR: 100 (27 Feb 2020 08:46) (84 - 114)  BP: 142/64 (27 Feb 2020 08:46) (142/64 - 180/79)  BP(mean): 92 (27 Feb 2020 08:46) (92 - 115)  RR: 16 (27 Feb 2020 08:46) (16 - 19)  SpO2: 95% (27 Feb 2020 08:46) (92% - 95%)  I&O's Detail    26 Feb 2020 07:01  -  27 Feb 2020 07:00  --------------------------------------------------------  IN:  Total IN: 0 mL    OUT:    Indwelling Catheter - Urethral: 125 mL    Voided: 550 mL  Total OUT: 675 mL    Total NET: -675 mL      CHEST TUBE:  no  MARICARMEN DRAIN: no  EPICARDIAL WIRES: no  TIE DOWNS: no  MORRISON: no    PHYSICAL EXAM:  General: well appearing standing up ambulating around room without difficutly   Neurological: AOx3. Motor skills grossly intact  Cardiovascular: Normal S1/S2. Regular rate/rhythm. No murmurs  Respiratory: Lungs CTA bilaterally. No wheezing or rales  Gastrointestinal: +BS in 4 quadrants, distened, soft, mildly tender to deep palpation   Extremities: Strength 5/5 b/l upper/lower extremities.  No edema. No calf tenderness.  Vascular: Radial 2+bilaterally, DP 2+ b/l  Incision Sites: abdominal incisions without erythema, purulence. Mild ecchmoysis around incisions.       LABS:                        13.5   10.03 )-----------( 124      ( 27 Feb 2020 06:29 )             42.3       COUMADIN:  no  PT/INR - ( 25 Feb 2020 13:20 )   PT: 11.1 sec;   INR: 0.97          PTT - ( 25 Feb 2020 13:20 )  PTT:29.3 sec    02-27    140  |  107  |  11  ----------------------------<  108<H>  4.5   |  22  |  0.51    Ca    8.7      27 Feb 2020 06:29  Mg     1.6     02-27            MEDICATIONS  (STANDING):  aspirin enteric coated 81 milliGRAM(s) Oral daily  atorvastatin 40 milliGRAM(s) Oral at bedtime  budesonide  80 MICROgram(s)/formoterol 4.5 MICROgram(s) Inhaler 2 Puff(s) Inhalation two times a day  heparin  Injectable 5000 Unit(s) SubCutaneous every 8 hours  lidocaine   Patch 1 Patch Transdermal daily  losartan 50 milliGRAM(s) Oral daily  pantoprazole    Tablet 40 milliGRAM(s) Oral before breakfast  polyethylene glycol 3350 17 Gram(s) Oral daily  senna 2 Tablet(s) Oral at bedtime  sertraline 12.5 milliGRAM(s) Oral daily    MEDICATIONS  (PRN):  acetaminophen   Tablet .. 650 milliGRAM(s) Oral every 6 hours PRN Mild Pain (1 - 3)  ALBUTerol    90 MICROgram(s) HFA Inhaler 2 Puff(s) Inhalation every 6 hours PRN Shortness of Breath and/or Wheezing  aluminum hydroxide/magnesium hydroxide/simethicone Suspension 30 milliLiter(s) Oral every 6 hours PRN Dyspepsia  LORazepam     Tablet 0.5 milliGRAM(s) Oral daily PRN Anxiety        RADIOLOGY & ADDITIONAL TESTS:  < from: Xray Chest 1 View-PORTABLE IMMEDIATE (02.26.20 @ 13:16) >  IMPRESSION: Right midline catheter appropriately positioned, with tip in the distal right axillary/proximal right subclavian vein. No other interval changes.  < end of copied text >

## 2020-02-27 NOTE — PROGRESS NOTE ADULT - ASSESSMENT
This is a 78 y/o F with a PMHx of hypereosinophilic syndrome, CABG in 1999, hiatal hernia, asthma. Pt referred by Dr. Lees to Dr. Ortiz regarding her hiatal hernia to discuss surgical evaluation. Pt deemed a good surgical candidate and on 2/25/2020 pt underwent EGD, laprascopic, RA, type 4 hiatal hernia repair with relaxed incision and mesh. OR course uncomplicated. Pt arrived to PACU and was transferred to  on POD#0. On POD#1 on clear liquid diet and tolerating well. A midline was place due to inability to obtain access. Plan to US today to look for pleural effusion and to advance diet to full liquids if US is okay.     Plan:    Neurovascular: Stable  -Pain well controlled with current regimen: PRN's:    Cardiovascular:   -Hemodynamically stable.   -Monitor: BP, HR, tele    Respiratory:   -Oxygentating well on room air  -Encourage continued use of IS 10x/hr and frequent ambulation  -CXR:    GI:  -GI PPX:  -PO Diet  -Bowel Regimen:     Renal / :  -Continue to monitor renal function: BUN/Cr  -Monitor I/O's daily     Endocrine:  no hx of DM or thyroid dx  -A1c:  -TSH:    Hematologic:  -CBC: H/H-  -Coagulation Panel.    ID:  -Temperature:   -CBC: WBC-  -Continue to observe for SIRS/Sepsis Syndrome.    Prophylaxis:  -DVT prophylaxis with 5000 SubQ Heparin q8h.  -Continue with SCD's b/l while patient is at rest     Disposition:  -Discharge home once patient is medically ready This is a 78 y/o F with a PMHx of hypereosinophilic syndrome, CABG in 1999, hiatal hernia, asthma. Pt referred by Dr. Lees to Dr. Ortiz regarding her hiatal hernia to discuss surgical evaluation. Pt deemed a good surgical candidate and on 2/25/2020 pt underwent EGD, laprascopic, RA, type 4 hiatal hernia repair with relaxed incision and mesh. OR course uncomplicated. Pt arrived to PACU and was transferred to  on POD#0. On POD#1 on clear liquid diet and tolerating well. A midline was placed yesterday due to poor access. US of the chest on POD#2 showing small pleural effusion with no good window for drainage.     Plan:  Neurovascular: Stable  -Pain well controlled with current regimen: PRN's: acetaminophen, lidocaine patch   -Anxiety/depression: Lorazepam PRN, sertraline 12.5mg daily     Cardiovascular: stable   -Hemodynamically stable.   -Monitor: BP, HR, tele  -C/w ASA   -HLD: C/w Atorvastatin   -HTN: losartan 50mg daily     Respiratory: hx of COPD   -Oxygentating well on room air  -Encourage continued use of IS 10x/hr and frequent ambulation  -CXR:  -COPD: Symbicort BID, proair   -US of left chest today showing small pleural effusion - no good window for drainage, repeat US tomorrow to assess     GI: POD#2 Hiatal hernia repair with relaxed incision and mesh   -GI PPX: pantoprazole 40mg daily   -PO Diet: full liquid  (advanced from clear liquids this AM)  -Bowel Regimen: miralax, senna   -Dyspepsia: maalox Q6 hrs    Renal / :  -Continue to monitor renal function: BUN/Cr 11/0.51  -Monitor I/O's daily     Endocrine:  no hx of DM or thyroid dx  -A1c: pending  -TSH: pending     Hematologic:  -CBC: H/H- 13.5/42.3  -Coagulation Panel.    ID:  -Temperature: afebrile   -CBC: WBC- 10.03   -Continue to observe for SIRS/Sepsis Syndrome.    Prophylaxis:  -DVT prophylaxis with 5000 SubQ Heparin q8h.  -Continue with SCD's b/l while patient is at rest     Disposition:  -Discharge home once patient is medically ready

## 2020-02-28 PROBLEM — F41.9 ANXIETY DISORDER, UNSPECIFIED: Chronic | Status: ACTIVE | Noted: 2020-02-24

## 2020-02-28 PROBLEM — E78.00 PURE HYPERCHOLESTEROLEMIA, UNSPECIFIED: Chronic | Status: ACTIVE | Noted: 2020-02-24

## 2020-02-28 PROBLEM — I10 ESSENTIAL (PRIMARY) HYPERTENSION: Chronic | Status: ACTIVE | Noted: 2020-02-24

## 2020-02-28 PROBLEM — J44.9 CHRONIC OBSTRUCTIVE PULMONARY DISEASE, UNSPECIFIED: Chronic | Status: ACTIVE | Noted: 2020-02-24

## 2020-02-28 PROBLEM — I20.9 ANGINA PECTORIS, UNSPECIFIED: Chronic | Status: ACTIVE | Noted: 2020-02-24

## 2020-02-28 PROBLEM — D72.1 EOSINOPHILIA: Chronic | Status: ACTIVE | Noted: 2020-02-24

## 2020-02-28 LAB
ANION GAP SERPL CALC-SCNC: 11 MMOL/L — SIGNIFICANT CHANGE UP (ref 5–17)
BUN SERPL-MCNC: 11 MG/DL — SIGNIFICANT CHANGE UP (ref 7–23)
CALCIUM SERPL-MCNC: 9.4 MG/DL — SIGNIFICANT CHANGE UP (ref 8.4–10.5)
CHLORIDE SERPL-SCNC: 106 MMOL/L — SIGNIFICANT CHANGE UP (ref 96–108)
CO2 SERPL-SCNC: 24 MMOL/L — SIGNIFICANT CHANGE UP (ref 22–31)
CREAT SERPL-MCNC: 0.55 MG/DL — SIGNIFICANT CHANGE UP (ref 0.5–1.3)
GLUCOSE SERPL-MCNC: 149 MG/DL — HIGH (ref 70–99)
HCT VFR BLD CALC: 41.6 % — SIGNIFICANT CHANGE UP (ref 34.5–45)
HGB BLD-MCNC: 13.6 G/DL — SIGNIFICANT CHANGE UP (ref 11.5–15.5)
MAGNESIUM SERPL-MCNC: 1.9 MG/DL — SIGNIFICANT CHANGE UP (ref 1.6–2.6)
MCHC RBC-ENTMCNC: 32.7 GM/DL — SIGNIFICANT CHANGE UP (ref 32–36)
MCHC RBC-ENTMCNC: 33.5 PG — SIGNIFICANT CHANGE UP (ref 27–34)
MCV RBC AUTO: 102.5 FL — HIGH (ref 80–100)
NRBC # BLD: 0 /100 WBCS — SIGNIFICANT CHANGE UP (ref 0–0)
PLATELET # BLD AUTO: 152 K/UL — SIGNIFICANT CHANGE UP (ref 150–400)
POTASSIUM SERPL-MCNC: 4.3 MMOL/L — SIGNIFICANT CHANGE UP (ref 3.5–5.3)
POTASSIUM SERPL-SCNC: 4.3 MMOL/L — SIGNIFICANT CHANGE UP (ref 3.5–5.3)
RBC # BLD: 4.06 M/UL — SIGNIFICANT CHANGE UP (ref 3.8–5.2)
RBC # FLD: 13.2 % — SIGNIFICANT CHANGE UP (ref 10.3–14.5)
SODIUM SERPL-SCNC: 141 MMOL/L — SIGNIFICANT CHANGE UP (ref 135–145)
WBC # BLD: 10.75 K/UL — HIGH (ref 3.8–10.5)
WBC # FLD AUTO: 10.75 K/UL — HIGH (ref 3.8–10.5)

## 2020-02-28 PROCEDURE — 71045 X-RAY EXAM CHEST 1 VIEW: CPT | Mod: 26

## 2020-02-28 PROCEDURE — 76604 US EXAM CHEST: CPT | Mod: 26

## 2020-02-28 RX ORDER — MAGNESIUM OXIDE 400 MG ORAL TABLET 241.3 MG
800 TABLET ORAL ONCE
Refills: 0 | Status: COMPLETED | OUTPATIENT
Start: 2020-02-28 | End: 2020-02-28

## 2020-02-28 RX ORDER — ACETAMINOPHEN 500 MG
325 TABLET ORAL ONCE
Refills: 0 | Status: COMPLETED | OUTPATIENT
Start: 2020-02-28 | End: 2020-02-28

## 2020-02-28 RX ADMIN — Medication 325 MILLIGRAM(S): at 15:34

## 2020-02-28 RX ADMIN — HEPARIN SODIUM 5000 UNIT(S): 5000 INJECTION INTRAVENOUS; SUBCUTANEOUS at 14:12

## 2020-02-28 RX ADMIN — MAGNESIUM OXIDE 400 MG ORAL TABLET 800 MILLIGRAM(S): 241.3 TABLET ORAL at 14:12

## 2020-02-28 RX ADMIN — PANTOPRAZOLE SODIUM 40 MILLIGRAM(S): 20 TABLET, DELAYED RELEASE ORAL at 07:40

## 2020-02-28 RX ADMIN — SERTRALINE 12.5 MILLIGRAM(S): 25 TABLET, FILM COATED ORAL at 11:42

## 2020-02-28 RX ADMIN — Medication 81 MILLIGRAM(S): at 11:42

## 2020-02-28 RX ADMIN — LIDOCAINE 1 PATCH: 4 CREAM TOPICAL at 18:25

## 2020-02-28 RX ADMIN — HEPARIN SODIUM 5000 UNIT(S): 5000 INJECTION INTRAVENOUS; SUBCUTANEOUS at 05:26

## 2020-02-28 RX ADMIN — Medication 650 MILLIGRAM(S): at 06:38

## 2020-02-28 RX ADMIN — LIDOCAINE 1 PATCH: 4 CREAM TOPICAL at 11:43

## 2020-02-28 RX ADMIN — HEPARIN SODIUM 5000 UNIT(S): 5000 INJECTION INTRAVENOUS; SUBCUTANEOUS at 21:03

## 2020-02-28 RX ADMIN — Medication 650 MILLIGRAM(S): at 11:53

## 2020-02-28 RX ADMIN — LIDOCAINE 1 PATCH: 4 CREAM TOPICAL at 23:43

## 2020-02-28 RX ADMIN — BUDESONIDE AND FORMOTEROL FUMARATE DIHYDRATE 2 PUFF(S): 160; 4.5 AEROSOL RESPIRATORY (INHALATION) at 07:39

## 2020-02-28 RX ADMIN — Medication 325 MILLIGRAM(S): at 16:32

## 2020-02-28 RX ADMIN — ALBUTEROL 2 PUFF(S): 90 AEROSOL, METERED ORAL at 18:00

## 2020-02-28 RX ADMIN — Medication 650 MILLIGRAM(S): at 12:15

## 2020-02-28 RX ADMIN — BUDESONIDE AND FORMOTEROL FUMARATE DIHYDRATE 2 PUFF(S): 160; 4.5 AEROSOL RESPIRATORY (INHALATION) at 19:11

## 2020-02-28 RX ADMIN — LOSARTAN POTASSIUM 50 MILLIGRAM(S): 100 TABLET, FILM COATED ORAL at 05:25

## 2020-02-28 RX ADMIN — ATORVASTATIN CALCIUM 40 MILLIGRAM(S): 80 TABLET, FILM COATED ORAL at 21:03

## 2020-02-28 RX ADMIN — Medication 650 MILLIGRAM(S): at 05:25

## 2020-02-28 RX ADMIN — SENNA PLUS 2 TABLET(S): 8.6 TABLET ORAL at 21:03

## 2020-02-28 NOTE — DIETITIAN INITIAL EVALUATION ADULT. - ENERGY NEEDS
Ideal body weight used for calculations as pt >120% of IBW.   ABW 59.9kg, IBW 40kg, 149% IBW, ht 58", BMI 26.7   Nutrient needs based on [facility] standards of care for maintenance in adults, adjusted for age, post-op needs, fluid per team

## 2020-02-28 NOTE — PROGRESS NOTE ADULT - ASSESSMENT
This is a 78 y/o F with a PMHx of hypereosinophilic syndrome, CABG in 1999, hiatal hernia, asthma. Pt referred by Dr. Lees to Dr. Ortiz regarding her hiatal hernia to discuss surgical evaluation. Pt deemed a good surgical candidate and on 2/25/2020 pt underwent EGD, laprascopic, RA, type 4 hiatal hernia repair with relaxed incision and mesh. OR course uncomplicated. Pt arrived to PACU and was transferred to  on POD#0. On POD#1 on clear liquid diet and tolerating well. A midline was placed due to poor access. US of the chest on POD#2 showing small pleural effusion with no good window for drainage. On POD#3 pt remains stable and repeat US showing stable small pleural effusion.     Plan:  Neurovascular: Stable  -Pain well controlled with current regimen: PRN's: acetaminophen, lidocaine patch   -Anxiety/depression: Lorazepam PRN, sertraline 12.5mg daily     Cardiovascular: stable   -Hemodynamically stable.   -Monitor: BP, HR, tele  -C/w ASA   -HLD: C/w Atorvastatin   -HTN: losartan 50mg daily     Respiratory: hx of COPD   -Oxygentating well on room air  -Encourage continued use of IS 10x/hr and frequent ambulation  -CXR: no acute pathology, stable left chest mild effusion   -COPD: Symbicort BID, proair   -Repeat US of left chest today showing stable small pleural effusion - no good window for drainage    GI: POD#3 Hiatal hernia repair with relaxed incision and mesh   -GI PPX: pantoprazole 40mg daily   -PO Diet: full liquid diet (tolerating well)   -Bowel Regimen: miralax, senna   -Dyspepsia: maalox Q6 hrs    Renal / :  -Continue to monitor renal function: BUN/Cr 11/0.55  -Monitor I/O's daily     Endocrine:  no hx of DM or thyroid dx  -A1c: 5.2  -TSH: 2.15    Hematologic:  -CBC: H/H- 13.6/41.6    ID:  -Temperature: afebrile   -CBC: WBC-  10.75  -Continue to observe for SIRS/Sepsis Syndrome.    Prophylaxis:  -DVT prophylaxis with 5000 SubQ Heparin q8h.  -Continue with SCD's b/l while patient is at rest     Disposition:  -Discharge home once patient is medically ready, possibly tomorrow

## 2020-02-28 NOTE — DIETITIAN INITIAL EVALUATION ADULT. - OTHER INFO
79F with a PMHx of hypereosinophilic syndrome, CABG in 1999, hiatal hernia, asthma. Referred for her hiatal hernia/ possible surgical evaluation. Pt deemed a good surgical candidate and on 2/25/2020 pt underwent EGD, laparoscopic, RA, type 4 hiatal hernia repair with relaxed incision and mesh. OR course uncomplicated. Pt arrived to PACU and was transferred to  on POD#0. On POD#1 on clear liquid diet and tolerating well. A midline was placed yesterday due to poor access. US of the chest on POD#2 showing small pleural effusion with no good window for drainage, repeat U/S today to assess. Now POD 3 pt on full liquid diet, observed 75% of tray consumed, eager to order next meal. Reports some feelings of gas/ food stuck in throat after swallowing but quickly resolves. Otherwise denies n/v/d/c, chewing/ swallowing issues or pain impacting intake, skin with surgical incision. NKFA or changes in wt per pt. Eager to go home. Discussed likely stages of diet advancement with pt, receptive. Will follow per protocol.

## 2020-02-28 NOTE — PROGRESS NOTE ADULT - SUBJECTIVE AND OBJECTIVE BOX
Patient discussed on morning rounds with Dr. Ortiz      Operation / Date: 2/25/2020 - EGD, enrestinarascopmorgan, RA, type 4 hiatal hernia repair with relaxed incision and mesh     SUBJECTIVE ASSESSMENT:  Pt is feeling well this morning. She admits she is still having referred pain to her right shoulder but that it is improved from yesterday. Ambulates without difficutly, tolerating full liquid diet well. Using her IS frequently. Denies any CP, palpitations, SOB, wheezing, abd pain, n/v/d/c, fevers or chills.     Vital Signs Last 24 Hrs  T(C): 37.2 (28 Feb 2020 10:00), Max: 37.4 (27 Feb 2020 17:36)  T(F): 99 (28 Feb 2020 10:00), Max: 99.4 (27 Feb 2020 17:36)  HR: 95 (28 Feb 2020 09:36) (85 - 104)  BP: 127/68 (28 Feb 2020 09:36) (127/68 - 171/80)  BP(mean): 88 (28 Feb 2020 09:36) (86 - 115)  RR: 18 (28 Feb 2020 09:36) (16 - 18)  SpO2: 97% (28 Feb 2020 09:36) (94% - 98%)  I&O's Detail    27 Feb 2020 07:01  -  28 Feb 2020 07:00  --------------------------------------------------------  IN:    Oral Fluid: 500 mL  Total IN: 500 mL    OUT:    Voided: 850 mL  Total OUT: 850 mL    Total NET: -350 mL      28 Feb 2020 07:01  -  28 Feb 2020 12:33  --------------------------------------------------------  IN:    Oral Fluid: 250 mL  Total IN: 250 mL    OUT:  Total OUT: 0 mL    Total NET: 250 mL      CHEST TUBE:  no  MARICARMEN DRAIN:  no  EPICARDIAL WIRES: no  TIE DOWNS: no  MORRISON: no    PHYSICAL:  General: well appearing sitting in chair at bedside, no distress  Neurological: AOx3. Motor skills grossly intact  Cardiovascular: Normal S1/S2. Regular rate/rhythm. No murmurs  Respiratory: Lungs CTA bilaterally. No wheezing or rales  Gastrointestinal: +BS in 4 quadrants, mildly distended, soft, mildly tender to deep palpation   Extremities: Strength 5/5 b/l upper/lower extremities.  No edema. No calf tenderness.  Vascular: Radial 2+bilaterally, DP 2+ b/l  Incision Sites: abdominal incisions without erythema, purulence and mild ecchymosis     LABS:                        13.6   10.75 )-----------( 152      ( 28 Feb 2020 10:24 )             41.6       COUMADIN:  no        02-28    141  |  106  |  11  ----------------------------<  149<H>  4.3   |  24  |  0.55    Ca    9.4      28 Feb 2020 10:24  Mg     1.9     02-28            MEDICATIONS  (STANDING):  aspirin enteric coated 81 milliGRAM(s) Oral daily  atorvastatin 40 milliGRAM(s) Oral at bedtime  budesonide  80 MICROgram(s)/formoterol 4.5 MICROgram(s) Inhaler 2 Puff(s) Inhalation two times a day  heparin  Injectable 5000 Unit(s) SubCutaneous every 8 hours  lidocaine   Patch 1 Patch Transdermal daily  losartan 50 milliGRAM(s) Oral daily  magnesium oxide 800 milliGRAM(s) Oral once  pantoprazole    Tablet 40 milliGRAM(s) Oral before breakfast  polyethylene glycol 3350 17 Gram(s) Oral daily  senna 2 Tablet(s) Oral at bedtime  sertraline 12.5 milliGRAM(s) Oral daily    MEDICATIONS  (PRN):  acetaminophen   Tablet .. 650 milliGRAM(s) Oral every 6 hours PRN Mild Pain (1 - 3)  ALBUTerol    90 MICROgram(s) HFA Inhaler 2 Puff(s) Inhalation every 6 hours PRN Shortness of Breath and/or Wheezing  aluminum hydroxide/magnesium hydroxide/simethicone Suspension 30 milliLiter(s) Oral every 6 hours PRN Dyspepsia  LORazepam     Tablet 0.5 milliGRAM(s) Oral daily PRN Anxiety        RADIOLOGY & ADDITIONAL TESTS:  < from: Xray Chest 1 View- PORTABLE-Routine (02.28.20 @ 05:23) >  Findings/  impression: Right opacity/pleural effusion, stable. Left opacity/pleural effusion, resolving. Heart size within normal limits, status post median sternotomy and CABG. Stable bony structures..  < end of copied text >

## 2020-02-28 NOTE — DIETITIAN INITIAL EVALUATION ADULT. - ADD RECOMMEND
1. Encourage intake through day 2. Manage pain prn 3. Monitor and replete lytes 4. Advance diet as tolerated FLD-> soft 5. Will follow for education

## 2020-02-29 LAB
ANION GAP SERPL CALC-SCNC: 10 MMOL/L — SIGNIFICANT CHANGE UP (ref 5–17)
BUN SERPL-MCNC: 11 MG/DL — SIGNIFICANT CHANGE UP (ref 7–23)
CALCIUM SERPL-MCNC: 8.9 MG/DL — SIGNIFICANT CHANGE UP (ref 8.4–10.5)
CHLORIDE SERPL-SCNC: 109 MMOL/L — HIGH (ref 96–108)
CO2 SERPL-SCNC: 24 MMOL/L — SIGNIFICANT CHANGE UP (ref 22–31)
CREAT SERPL-MCNC: 0.48 MG/DL — LOW (ref 0.5–1.3)
GLUCOSE SERPL-MCNC: 108 MG/DL — HIGH (ref 70–99)
HCT VFR BLD CALC: 40.3 % — SIGNIFICANT CHANGE UP (ref 34.5–45)
HGB BLD-MCNC: 13.1 G/DL — SIGNIFICANT CHANGE UP (ref 11.5–15.5)
MAGNESIUM SERPL-MCNC: 1.8 MG/DL — SIGNIFICANT CHANGE UP (ref 1.6–2.6)
MCHC RBC-ENTMCNC: 32.5 GM/DL — SIGNIFICANT CHANGE UP (ref 32–36)
MCHC RBC-ENTMCNC: 33 PG — SIGNIFICANT CHANGE UP (ref 27–34)
MCV RBC AUTO: 101.5 FL — HIGH (ref 80–100)
NRBC # BLD: 0 /100 WBCS — SIGNIFICANT CHANGE UP (ref 0–0)
PLATELET # BLD AUTO: 137 K/UL — LOW (ref 150–400)
POTASSIUM SERPL-MCNC: 4.5 MMOL/L — SIGNIFICANT CHANGE UP (ref 3.5–5.3)
POTASSIUM SERPL-SCNC: 4.5 MMOL/L — SIGNIFICANT CHANGE UP (ref 3.5–5.3)
RBC # BLD: 3.97 M/UL — SIGNIFICANT CHANGE UP (ref 3.8–5.2)
RBC # FLD: 13.2 % — SIGNIFICANT CHANGE UP (ref 10.3–14.5)
SODIUM SERPL-SCNC: 143 MMOL/L — SIGNIFICANT CHANGE UP (ref 135–145)
WBC # BLD: 7.28 K/UL — SIGNIFICANT CHANGE UP (ref 3.8–10.5)
WBC # FLD AUTO: 7.28 K/UL — SIGNIFICANT CHANGE UP (ref 3.8–10.5)

## 2020-02-29 PROCEDURE — 71045 X-RAY EXAM CHEST 1 VIEW: CPT | Mod: 26

## 2020-02-29 RX ORDER — MAGNESIUM OXIDE 400 MG ORAL TABLET 241.3 MG
800 TABLET ORAL ONCE
Refills: 0 | Status: COMPLETED | OUTPATIENT
Start: 2020-02-29 | End: 2020-02-29

## 2020-02-29 RX ADMIN — Medication 325 MILLIGRAM(S): at 20:00

## 2020-02-29 RX ADMIN — MAGNESIUM OXIDE 400 MG ORAL TABLET 800 MILLIGRAM(S): 241.3 TABLET ORAL at 07:58

## 2020-02-29 RX ADMIN — Medication 0.5 MILLIGRAM(S): at 07:01

## 2020-02-29 RX ADMIN — Medication 650 MILLIGRAM(S): at 15:47

## 2020-02-29 RX ADMIN — LIDOCAINE 1 PATCH: 4 CREAM TOPICAL at 19:00

## 2020-02-29 RX ADMIN — HEPARIN SODIUM 5000 UNIT(S): 5000 INJECTION INTRAVENOUS; SUBCUTANEOUS at 21:51

## 2020-02-29 RX ADMIN — ATORVASTATIN CALCIUM 40 MILLIGRAM(S): 80 TABLET, FILM COATED ORAL at 21:50

## 2020-02-29 RX ADMIN — HEPARIN SODIUM 5000 UNIT(S): 5000 INJECTION INTRAVENOUS; SUBCUTANEOUS at 06:30

## 2020-02-29 RX ADMIN — BUDESONIDE AND FORMOTEROL FUMARATE DIHYDRATE 2 PUFF(S): 160; 4.5 AEROSOL RESPIRATORY (INHALATION) at 06:35

## 2020-02-29 RX ADMIN — PANTOPRAZOLE SODIUM 40 MILLIGRAM(S): 20 TABLET, DELAYED RELEASE ORAL at 06:28

## 2020-02-29 RX ADMIN — Medication 81 MILLIGRAM(S): at 11:55

## 2020-02-29 RX ADMIN — HEPARIN SODIUM 5000 UNIT(S): 5000 INJECTION INTRAVENOUS; SUBCUTANEOUS at 14:26

## 2020-02-29 RX ADMIN — SERTRALINE 12.5 MILLIGRAM(S): 25 TABLET, FILM COATED ORAL at 11:55

## 2020-02-29 RX ADMIN — Medication 650 MILLIGRAM(S): at 07:35

## 2020-02-29 RX ADMIN — Medication 650 MILLIGRAM(S): at 21:50

## 2020-02-29 RX ADMIN — Medication 650 MILLIGRAM(S): at 23:00

## 2020-02-29 RX ADMIN — Medication 650 MILLIGRAM(S): at 16:47

## 2020-02-29 RX ADMIN — BUDESONIDE AND FORMOTEROL FUMARATE DIHYDRATE 2 PUFF(S): 160; 4.5 AEROSOL RESPIRATORY (INHALATION) at 17:43

## 2020-02-29 RX ADMIN — Medication 650 MILLIGRAM(S): at 03:21

## 2020-02-29 RX ADMIN — LOSARTAN POTASSIUM 50 MILLIGRAM(S): 100 TABLET, FILM COATED ORAL at 06:28

## 2020-02-29 RX ADMIN — LIDOCAINE 1 PATCH: 4 CREAM TOPICAL at 11:54

## 2020-02-29 NOTE — PROGRESS NOTE ADULT - ASSESSMENT
A/P: 79 year old female, with PMHx of hypereosinophilic syndrome, HTN, CAD s/p CABG 1999, hiatal hernia, asthma evaluated by Dr. Ortiz as an outpatient for her type 4 hiatal hernia. She was deemed a surgical candidate and presented to [facility] on 2/25/2020 for elective surgery. Patient underwent laparoscopic type 4 hiatal hernia repair with Dr. Ortiz. Procedure was uncomplicated and she was transferred to  post operatively in stable condition. She was started on clear liquid diet on POD#1 which she tolerated well and was advanced to full liquids on POD#2. On POD#2 patient noted to have small left pleural effusion which has remained stable on serial ultrasounds. No acute issues overnight.     Neurovascular: Stable. Pain controlled with current regimen   - Continue tylenol PRN   - Continue sertraline 12.5mg daily for depression     Cardiovascular: Hemodynamically stable. HR controlled.  - CAD s/p CABG 1999, continue asa 81mg, atorvastatin 40mg qhs   - HTN, continue losartan 50mg     Respiratory: 02 Sat = 95% on RA.  - CXR stable L sided effusion, bedside ultrasound yesterday no change. Continue to monitor with daily CXR   - Encourage C+DB and Use of IS 10x / hr while awake.  - Asthma, continue nebulizers/inhalers     GI: Stable.  - Continue full liquid diet   - Continue protonix 40mg for GI ppx   - Continue bowel regimen     Renal / : BUN/Cr 11/0.48. No active issues   - Monitor renal function.  - Monitor I/O's.    Endocrine: hgba1c 5.2, TSH wnl   - no active issues     Prophylaxis:  - DVT prophylaxis with 5000 SubQ Heparin q8h.  - SCD's    Disposition: Home when medically ready

## 2020-02-29 NOTE — PROGRESS NOTE ADULT - SUBJECTIVE AND OBJECTIVE BOX
Patient discussed on morning rounds with Dr. Collins      Operation / Date: 2/25/2020 EGD, Laparoscopic, RA type 4 hiatal hernia repair with relaxed incision and mesh     SUBJECTIVE ASSESSMENT:  Patient seen this morning at bedside, doing well and not offering any complaints at this time. States she is feeling better compared to yesterday. Denies any chest pain or SOB.     Vital Signs Last 24 Hrs  T(C): 37.1 (29 Feb 2020 13:26), Max: 37.2 (29 Feb 2020 05:01)  T(F): 98.7 (29 Feb 2020 13:26), Max: 98.9 (29 Feb 2020 05:01)  HR: 98 (29 Feb 2020 13:00) (83 - 106)  BP: 135/69 (29 Feb 2020 13:00) (116/60 - 168/81)  BP(mean): 102 (29 Feb 2020 13:00) (97 - 116)  RR: 24 (29 Feb 2020 13:00) (20 - 52)  SpO2: 95% (29 Feb 2020 13:00) (89% - 97%)  I&O's Detail    28 Feb 2020 07:01  -  29 Feb 2020 07:00  --------------------------------------------------------  IN:    Oral Fluid: 800 mL  Total IN: 800 mL    OUT:    Voided: 300 mL  Total OUT: 300 mL    Total NET: 500 mL      29 Feb 2020 07:01  -  29 Feb 2020 14:25  --------------------------------------------------------  IN:  Total IN: 0 mL    OUT:    Voided: 200 mL  Total OUT: 200 mL    Total NET: -200 mL    CHEST TUBE: No  MARICARMEN DRAIN: No  EPICARDIAL WIRES: No  TIE DOWNS: No  MORRISON: No    PHYSICAL EXAM:    General: Patient sitting comfortably in a chair, no acute distress     Neurological: Alert and oriented. No focal neurological deficits     Cardiovascular: S1S2, RRR, no murmurs appreciated on exam     Respiratory: Decreased breath sounds at bases bilaterally     Gastrointestinal: Abdomen soft, non tender, non distended     Extremities: Warm and well perfused. No edema or calf tenderness     Vascular: Peripheral pulses     Incision Sites:   LABS:                        13.1   7.28  )-----------( 137      ( 29 Feb 2020 06:40 )             40.3       COUMADIN:  Yes/No. REASON: .        02-29    143  |  109<H>  |  11  ----------------------------<  108<H>  4.5   |  24  |  0.48<L>    Ca    8.9      29 Feb 2020 06:40  Mg     1.8     02-29            MEDICATIONS  (STANDING):  aspirin enteric coated 81 milliGRAM(s) Oral daily  atorvastatin 40 milliGRAM(s) Oral at bedtime  budesonide  80 MICROgram(s)/formoterol 4.5 MICROgram(s) Inhaler 2 Puff(s) Inhalation two times a day  heparin  Injectable 5000 Unit(s) SubCutaneous every 8 hours  lidocaine   Patch 1 Patch Transdermal daily  losartan 50 milliGRAM(s) Oral daily  pantoprazole    Tablet 40 milliGRAM(s) Oral before breakfast  polyethylene glycol 3350 17 Gram(s) Oral daily  senna 2 Tablet(s) Oral at bedtime  sertraline 12.5 milliGRAM(s) Oral daily    MEDICATIONS  (PRN):  acetaminophen   Tablet .. 650 milliGRAM(s) Oral every 6 hours PRN Mild Pain (1 - 3)  ALBUTerol    90 MICROgram(s) HFA Inhaler 2 Puff(s) Inhalation every 6 hours PRN Shortness of Breath and/or Wheezing  aluminum hydroxide/magnesium hydroxide/simethicone Suspension 30 milliLiter(s) Oral every 6 hours PRN Dyspepsia  LORazepam     Tablet 0.5 milliGRAM(s) Oral daily PRN Anxiety        RADIOLOGY & ADDITIONAL TESTS: Patient discussed on morning rounds with Dr. Collins      Operation / Date: 2/25/2020 EGD, Laparoscopic, RA type 4 hiatal hernia repair with relaxed incision and mesh     SUBJECTIVE ASSESSMENT:  Patient seen this morning at bedside, doing well and not offering any complaints at this time. States she is feeling better compared to yesterday. Denies any chest pain or SOB.     Vital Signs Last 24 Hrs  T(C): 37.1 (29 Feb 2020 13:26), Max: 37.2 (29 Feb 2020 05:01)  T(F): 98.7 (29 Feb 2020 13:26), Max: 98.9 (29 Feb 2020 05:01)  HR: 98 (29 Feb 2020 13:00) (83 - 106)  BP: 135/69 (29 Feb 2020 13:00) (116/60 - 168/81)  BP(mean): 102 (29 Feb 2020 13:00) (97 - 116)  RR: 24 (29 Feb 2020 13:00) (20 - 52)  SpO2: 95% (29 Feb 2020 13:00) (89% - 97%)  I&O's Detail    28 Feb 2020 07:01  -  29 Feb 2020 07:00  --------------------------------------------------------  IN:    Oral Fluid: 800 mL  Total IN: 800 mL    OUT:    Voided: 300 mL  Total OUT: 300 mL    Total NET: 500 mL      29 Feb 2020 07:01  -  29 Feb 2020 14:25  --------------------------------------------------------  IN:  Total IN: 0 mL    OUT:    Voided: 200 mL  Total OUT: 200 mL    Total NET: -200 mL    CHEST TUBE: No  MARICARMEN DRAIN: No  EPICARDIAL WIRES: No  TIE DOWNS: No  MORRISON: No    PHYSICAL EXAM:    General: Patient sitting comfortably in a chair, no acute distress     Neurological: Alert and oriented. No focal neurological deficits     Cardiovascular: S1S2, RRR, no murmurs appreciated on exam     Respiratory: Decreased breath sounds at bases bilaterally     Gastrointestinal: Abdomen soft, non tender, non distended     Extremities: Warm and well perfused. No edema or calf tenderness     Vascular: Peripheral pulses 2+ bilaterally     Incision Sites: Abdominal laparoscopic incisions C/D/I     LABS:                        13.1   7.28  )-----------( 137      ( 29 Feb 2020 06:40 )             40.3       COUMADIN:  No    02-29    143  |  109<H>  |  11  ----------------------------<  108<H>  4.5   |  24  |  0.48<L>    Ca    8.9      29 Feb 2020 06:40  Mg     1.8     02-29      MEDICATIONS  (STANDING):  aspirin enteric coated 81 milliGRAM(s) Oral daily  atorvastatin 40 milliGRAM(s) Oral at bedtime  budesonide  80 MICROgram(s)/formoterol 4.5 MICROgram(s) Inhaler 2 Puff(s) Inhalation two times a day  heparin  Injectable 5000 Unit(s) SubCutaneous every 8 hours  lidocaine   Patch 1 Patch Transdermal daily  losartan 50 milliGRAM(s) Oral daily  pantoprazole    Tablet 40 milliGRAM(s) Oral before breakfast  polyethylene glycol 3350 17 Gram(s) Oral daily  senna 2 Tablet(s) Oral at bedtime  sertraline 12.5 milliGRAM(s) Oral daily    MEDICATIONS  (PRN):  acetaminophen   Tablet .. 650 milliGRAM(s) Oral every 6 hours PRN Mild Pain (1 - 3)  ALBUTerol    90 MICROgram(s) HFA Inhaler 2 Puff(s) Inhalation every 6 hours PRN Shortness of Breath and/or Wheezing  aluminum hydroxide/magnesium hydroxide/simethicone Suspension 30 milliLiter(s) Oral every 6 hours PRN Dyspepsia  LORazepam     Tablet 0.5 milliGRAM(s) Oral daily PRN Anxiety        RADIOLOGY & ADDITIONAL TESTS:

## 2020-03-01 PROCEDURE — 71045 X-RAY EXAM CHEST 1 VIEW: CPT | Mod: 26,76

## 2020-03-01 RX ORDER — SIMETHICONE 80 MG/1
80 TABLET, CHEWABLE ORAL EVERY 6 HOURS
Refills: 0 | Status: DISCONTINUED | OUTPATIENT
Start: 2020-03-01 | End: 2020-03-02

## 2020-03-01 RX ADMIN — LIDOCAINE 1 PATCH: 4 CREAM TOPICAL at 00:00

## 2020-03-01 RX ADMIN — PANTOPRAZOLE SODIUM 40 MILLIGRAM(S): 20 TABLET, DELAYED RELEASE ORAL at 06:45

## 2020-03-01 RX ADMIN — Medication 81 MILLIGRAM(S): at 11:42

## 2020-03-01 RX ADMIN — SIMETHICONE 80 MILLIGRAM(S): 80 TABLET, CHEWABLE ORAL at 23:42

## 2020-03-01 RX ADMIN — LIDOCAINE 1 PATCH: 4 CREAM TOPICAL at 23:42

## 2020-03-01 RX ADMIN — Medication 650 MILLIGRAM(S): at 21:43

## 2020-03-01 RX ADMIN — ATORVASTATIN CALCIUM 40 MILLIGRAM(S): 80 TABLET, FILM COATED ORAL at 21:41

## 2020-03-01 RX ADMIN — LIDOCAINE 1 PATCH: 4 CREAM TOPICAL at 11:42

## 2020-03-01 RX ADMIN — HEPARIN SODIUM 5000 UNIT(S): 5000 INJECTION INTRAVENOUS; SUBCUTANEOUS at 06:45

## 2020-03-01 RX ADMIN — SERTRALINE 12.5 MILLIGRAM(S): 25 TABLET, FILM COATED ORAL at 11:42

## 2020-03-01 RX ADMIN — SIMETHICONE 80 MILLIGRAM(S): 80 TABLET, CHEWABLE ORAL at 11:41

## 2020-03-01 RX ADMIN — SIMETHICONE 80 MILLIGRAM(S): 80 TABLET, CHEWABLE ORAL at 17:55

## 2020-03-01 RX ADMIN — LIDOCAINE 1 PATCH: 4 CREAM TOPICAL at 19:45

## 2020-03-01 RX ADMIN — BUDESONIDE AND FORMOTEROL FUMARATE DIHYDRATE 2 PUFF(S): 160; 4.5 AEROSOL RESPIRATORY (INHALATION) at 17:56

## 2020-03-01 RX ADMIN — BUDESONIDE AND FORMOTEROL FUMARATE DIHYDRATE 2 PUFF(S): 160; 4.5 AEROSOL RESPIRATORY (INHALATION) at 06:45

## 2020-03-01 RX ADMIN — HEPARIN SODIUM 5000 UNIT(S): 5000 INJECTION INTRAVENOUS; SUBCUTANEOUS at 21:42

## 2020-03-01 RX ADMIN — LOSARTAN POTASSIUM 50 MILLIGRAM(S): 100 TABLET, FILM COATED ORAL at 06:45

## 2020-03-01 RX ADMIN — Medication 650 MILLIGRAM(S): at 22:43

## 2020-03-01 NOTE — PROGRESS NOTE ADULT - SUBJECTIVE AND OBJECTIVE BOX
Patient discussed on morning rounds with Dr. Collins     Operation / Date: 2/25/2020 EGD, Laparoscopic, RA type 4 hiatal hernia repair with relaxed incision and mesh     SUBJECTIVE ASSESSMENT:  Patient seen this morning at bedside, tearful and happy that she is finally feeling better. Does endorse intermittent SOB on ambulation that is continuing to improve. C/o belching after meals but states she denies any dysphagia, nausea or vomiting. Patient states she does not feel up to going home until Tuesday.     Vital Signs Last 24 Hrs  T(C): 37.2 (01 Mar 2020 13:15), Max: 37.2 (01 Mar 2020 13:15)  T(F): 99 (01 Mar 2020 13:15), Max: 99 (01 Mar 2020 13:15)  HR: 100 (01 Mar 2020 09:00) (82 - 100)  BP: 143/86 (01 Mar 2020 09:00) (115/61 - 179/85)  BP(mean): 107 (01 Mar 2020 09:00) (82 - 122)  RR: 25 (01 Mar 2020 09:00) (20 - 25)  SpO2: 98% (01 Mar 2020 09:00) (93% - 98%)  I&O's Detail    29 Feb 2020 07:01  -  01 Mar 2020 07:00  --------------------------------------------------------  IN:    Oral Fluid: 420 mL  Total IN: 420 mL    OUT:    Voided: 200 mL  Total OUT: 200 mL    Total NET: 220 mL    CHEST TUBE: No  MARICARMEN DRAIN: No  EPICARDIAL WIRES: No  TIE DOWNS: No  MORRISON: No    PHYSICAL EXAM:  General: Patient sitting comfortably in a chair, no acute distress   Neurological: Alert and oriented. No focal neurological deficits   Cardiovascular: S1S2, RRR, no murmurs appreciated on exam   Respiratory: Decreased breath sounds at bases bilaterally   Gastrointestinal: Abdomen soft, non tender, non distended   Extremities: Warm and well perfused. No edema or calf tenderness   Vascular: Peripheral pulses 2+ bilaterally   Incision Sites: Abdominal laparoscopic incisions C/D/I     LABS:                        13.1   7.28  )-----------( 137      ( 29 Feb 2020 06:40 )             40.3       COUMADIN:  No    02-29    143  |  109<H>  |  11  ----------------------------<  108<H>  4.5   |  24  |  0.48<L>    Ca    8.9      29 Feb 2020 06:40  Mg     1.8     02-29    MEDICATIONS  (STANDING):  aspirin enteric coated 81 milliGRAM(s) Oral daily  atorvastatin 40 milliGRAM(s) Oral at bedtime  budesonide  80 MICROgram(s)/formoterol 4.5 MICROgram(s) Inhaler 2 Puff(s) Inhalation two times a day  heparin  Injectable 5000 Unit(s) SubCutaneous every 8 hours  lidocaine   Patch 1 Patch Transdermal daily  losartan 50 milliGRAM(s) Oral daily  pantoprazole    Tablet 40 milliGRAM(s) Oral before breakfast  polyethylene glycol 3350 17 Gram(s) Oral daily  senna 2 Tablet(s) Oral at bedtime  sertraline 12.5 milliGRAM(s) Oral daily  simethicone 80 milliGRAM(s) Chew every 6 hours    MEDICATIONS  (PRN):  acetaminophen   Tablet .. 650 milliGRAM(s) Oral every 6 hours PRN Mild Pain (1 - 3)  ALBUTerol    90 MICROgram(s) HFA Inhaler 2 Puff(s) Inhalation every 6 hours PRN Shortness of Breath and/or Wheezing  aluminum hydroxide/magnesium hydroxide/simethicone Suspension 30 milliLiter(s) Oral every 6 hours PRN Dyspepsia  LORazepam     Tablet 0.5 milliGRAM(s) Oral daily PRN Anxiety        RADIOLOGY & ADDITIONAL TESTS:

## 2020-03-01 NOTE — PROGRESS NOTE ADULT - ASSESSMENT
A/P: 79 year old female, with PMHx of hypereosinophilic syndrome, HTN, CAD s/p CABG 1999, hiatal hernia, asthma evaluated by Dr. Ortiz as an outpatient for her type 4 hiatal hernia. She was deemed a surgical candidate and presented to [facility] on 2/25/2020 for elective surgery. Patient underwent laparoscopic type 4 hiatal hernia repair with Dr. Ortiz. Procedure was uncomplicated and she was transferred to  post operatively in stable condition. She was started on clear liquid diet on POD#1 which she tolerated well and was advanced to full liquids on POD#2. On POD#2 patient noted to have small left pleural effusion which has remained stable on serial ultrasounds. No acute issues overnight.     Neurovascular: Stable. Pain controlled with current regimen   - Continue tylenol PRN   - Continue sertraline 12.5mg daily for depression   - Continue ativan 0.5mg PRN for anxiety     Cardiovascular: Hemodynamically stable. HR controlled.  - CAD s/p CABG 1999, continue asa 81mg, atorvastatin 40mg qhs   - HTN, continue losartan 50mg     Respiratory: 02 Sat = 98% on RA.  - CXR stable L sided effusion, continuing to improve on daily CXR. Continue to monitor   - Encourage C+DB and Use of IS 10x / hr while awake.  - Asthma, continue nebulizers/inhalers     GI: Stable.  - Continue full liquid diet   - Continue protonix 40mg for GI ppx   - Continue bowel regimen   - Simethicone added for belching     Renal / : BUN/Cr 11/0.48 yesterday. No active issues   - Monitor renal function.  - Monitor I/O's.    Endocrine: hgba1c 5.2, TSH wnl   - no active issues     Prophylaxis:  - DVT prophylaxis with 5000 SubQ Heparin q8h.  - SCD's    Disposition: Home when medically ready

## 2020-03-02 ENCOUNTER — TRANSCRIPTION ENCOUNTER (OUTPATIENT)
Age: 80
End: 2020-03-02

## 2020-03-02 VITALS — TEMPERATURE: 98 F

## 2020-03-02 PROCEDURE — 71045 X-RAY EXAM CHEST 1 VIEW: CPT | Mod: 26

## 2020-03-02 RX ORDER — SENNA PLUS 8.6 MG/1
2 TABLET ORAL
Qty: 0 | Refills: 0 | DISCHARGE
Start: 2020-03-02

## 2020-03-02 RX ORDER — ACETAMINOPHEN 500 MG
2 TABLET ORAL
Qty: 240 | Refills: 0
Start: 2020-03-02 | End: 2020-03-31

## 2020-03-02 RX ORDER — SIMETHICONE 80 MG/1
1 TABLET, CHEWABLE ORAL
Qty: 0 | Refills: 0 | DISCHARGE
Start: 2020-03-02

## 2020-03-02 RX ORDER — POLYETHYLENE GLYCOL 3350 17 G/17G
17 POWDER, FOR SOLUTION ORAL
Qty: 0 | Refills: 0 | DISCHARGE
Start: 2020-03-02

## 2020-03-02 RX ADMIN — LIDOCAINE 1 PATCH: 4 CREAM TOPICAL at 11:11

## 2020-03-02 RX ADMIN — Medication 81 MILLIGRAM(S): at 11:11

## 2020-03-02 RX ADMIN — POLYETHYLENE GLYCOL 3350 17 GRAM(S): 17 POWDER, FOR SOLUTION ORAL at 11:11

## 2020-03-02 RX ADMIN — SERTRALINE 12.5 MILLIGRAM(S): 25 TABLET, FILM COATED ORAL at 11:11

## 2020-03-02 RX ADMIN — SIMETHICONE 80 MILLIGRAM(S): 80 TABLET, CHEWABLE ORAL at 11:11

## 2020-03-02 RX ADMIN — SIMETHICONE 80 MILLIGRAM(S): 80 TABLET, CHEWABLE ORAL at 05:16

## 2020-03-02 RX ADMIN — PANTOPRAZOLE SODIUM 40 MILLIGRAM(S): 20 TABLET, DELAYED RELEASE ORAL at 07:19

## 2020-03-02 RX ADMIN — HEPARIN SODIUM 5000 UNIT(S): 5000 INJECTION INTRAVENOUS; SUBCUTANEOUS at 05:16

## 2020-03-02 RX ADMIN — Medication 300 MILLIGRAM(S): at 13:36

## 2020-03-02 RX ADMIN — LOSARTAN POTASSIUM 50 MILLIGRAM(S): 100 TABLET, FILM COATED ORAL at 05:16

## 2020-03-02 RX ADMIN — BUDESONIDE AND FORMOTEROL FUMARATE DIHYDRATE 2 PUFF(S): 160; 4.5 AEROSOL RESPIRATORY (INHALATION) at 05:16

## 2020-03-02 NOTE — PROGRESS NOTE ADULT - SUBJECTIVE AND OBJECTIVE BOX
Patient discussed on morning rounds with Dr. Ortiz    Operation / Date: 2/25/2020 - EGD, laprascopic, RA, type 4 hiatal hernia repair with relaxed incision and mesh     Surgeon: Dr. Ortiz     Referring Physician: Dr. Lees     SUBJECTIVE ASSESSMENT  Pt is feeling well this morning and is looking forward to going home today. Ambulates well with a walker multiple times a day. Eating/drinking well     HOSPITAL COURSE:  79y Female       Vital Signs Last 24 Hrs  T(C): 36.8 (02 Mar 2020 10:25), Max: 37.3 (02 Mar 2020 05:09)  T(F): 98.2 (02 Mar 2020 10:25), Max: 99.1 (02 Mar 2020 05:09)  HR: 90 (02 Mar 2020 13:08) (80 - 95)  BP: 112/68 (02 Mar 2020 13:08) (112/68 - 174/86)  BP(mean): 85 (02 Mar 2020 13:08) (85 - 120)  RR: 19 (02 Mar 2020 13:08) (17 - 22)  SpO2: 98% (02 Mar 2020 13:08) (95% - 98%)    EPICARDIAL WIRES REMOVED: Yes/No.  TIE DOWNS REMOVED: Yes/No.    PHYSICAL EXAM:    General:     Neurological:    Cardiovascular:    Respiratory:    Gastrointestinal:    Extremities:    Vascular:    Incision Sites:    LABS:      COUMADIN:  Yes/No.        DOSE:                  INDICATION:                GOAL INR:                    Discharge CXR:    Discharge ECHO: Patient discussed on morning rounds with Dr. Ortiz    Operation / Date: 2/25/2020 - EGD, laprascopic, RA, type 4 hiatal hernia repair with relaxed incision and mesh     Surgeon: Dr. Ortiz     Referring Physician: Dr. Lees     SUBJECTIVE ASSESSMENT  Pt is feeling well this morning and is looking forward to going home today. Ambulates well with a walker multiple times a day. Eating/drinking well. Moving her bowels and passing gas. Denies any cp, palpitations, sob, wheezing, abd pain, n/v/d/c, fevers or chills.     HOSPITAL COURSE:  This is a 79 year old female, with PMHx of hypereosinophilic syndrome, HTN, CAD s/p CABG 1999, hiatal hernia, asthma evaluated by Dr. Ortiz as an outpatient for her type 4 hiatal hernia. She was deemed a surgical candidate and presented to [facility] on 2/25/2020 for elective surgery and  underwent laparoscopic type 4 hiatal hernia repair with Dr. Ortiz. Or course was uncomplicated and she was transferred to  in stable condition. She was started on clear liquid diet on POD#1 which she tolerated well and was advanced to full liquids on POD#2. On POD#2 patient noted to have small left pleural effusion which has remained stable on serial ultrasounds. Pt remained stable medically from POD#3-5 but did not feel ready to be discharged home yet. PT evaluated the pt and she was ordered a walker to go home with. On POD#5 pt is medically ready to be discharged home and feels ready to go home on a full liquid diet.     Over 35 minutes was spent with the patient reviewing the discharge material including medications, follow up appointments, recovery, concerning symptoms, and how to contact their health care providers if they have questions     Of note: pt has all her medications at home and after thorough medication review with the patient, she stated she did not want any repeat medications sent to the pharmacy. Pt agrees she will  her pain medication and her antibiotic at [facility] on her way out    Additionally: pt noted to have minor erythema surrounding one of abdominal incisions on day of discharge, pt being sent home with clindamycin (confirmed no allergy) for 7 days.       Vital Signs Last 24 Hrs  T(C): 36.8 (02 Mar 2020 10:25), Max: 37.3 (02 Mar 2020 05:09)  T(F): 98.2 (02 Mar 2020 10:25), Max: 99.1 (02 Mar 2020 05:09)  HR: 90 (02 Mar 2020 13:08) (80 - 95)  BP: 112/68 (02 Mar 2020 13:08) (112/68 - 174/86)  BP(mean): 85 (02 Mar 2020 13:08) (85 - 120)  RR: 19 (02 Mar 2020 13:08) (17 - 22)  SpO2: 98% (02 Mar 2020 13:08) (95% - 98%)    EPICARDIAL WIRES REMOVED: n/a  TIE DOWNS REMOVED: n/a    PHYSICAL EXAM:  General: well appearing, sitting up in chair in NAD   Neurological: AOx3. Motor skills grossly intact  Cardiovascular: Normal S1/S2. Regular rate/rhythm. No murmurs  Respiratory: Lungs CTA bilaterally. No wheezing or rales  Gastrointestinal: +BS in all 4 quadrants. Mild distention. Non-tender.   Extremities: Strength 5/5 b/l upper/lower extremities.  No edema. No calf tenderness.  Vascular: Radial 2+bilaterally, DP 2+ b/l  Incision Sites: Abdominal incisions healing well, x1 incision with mild erythema but the rest of the incisions healing well, no erythema, purulence or ecchymosis.       LABS: lab holiday today       COUMADIN: no          Discharge CXR:  < from: Xray Chest 1 View- PORTABLE-Routine (03.02.20 @ 04:11) >  Findings/  impression: Stable right central line. Stable heart size, status post median sternotomy and CABG. Right opacity/pleural effusion, resolving. Left opacity/pleural effusion, stable. Stable bony structures. Epigastric surgical clips.  < end of copied text > Patient discussed on morning rounds with Dr. Ortiz    Operation / Date: 2/25/2020 - EGD, laprascopic, RA, type 4 hiatal hernia repair with relaxed incision and mesh     Surgeon: Dr. Ortiz     Referring Physician: Dr. Lees     SUBJECTIVE ASSESSMENT  Pt is feeling well this morning and is looking forward to going home today. Ambulates well with a walker multiple times a day. Eating/drinking well. Moving her bowels and passing gas. Denies any cp, palpitations, sob, wheezing, abd pain, n/v/d/c, fevers or chills.     HOSPITAL COURSE:  This is a 79 year old female, with PMHx of hypereosinophilic syndrome, HTN, CAD s/p CABG 1999, hiatal hernia, asthma evaluated by Dr. Ortiz as an outpatient for her type 4 hiatal hernia. She was deemed a surgical candidate and presented to [facility] on 2/25/2020 for elective surgery and  underwent laparoscopic type 4 hiatal hernia repair with Dr. Ortiz. Or course was uncomplicated and she was transferred to  in stable condition. She was started on clear liquid diet on POD#1 which she tolerated well and was advanced to full liquids on POD#2. On POD#2 patient noted to have small left pleural effusion which has remained stable on serial ultrasounds. Pt remained stable medically from POD#3-5 but did not feel ready to be discharged home yet. PT evaluated the pt and she was ordered a walker to go home with. On POD#5 pt is medically ready to be discharged home and feels ready to go home on a full liquid diet.     Over 35 minutes was spent with the patient reviewing the discharge material including medications, follow up appointments, recovery, concerning symptoms, and how to contact their health care providers if they have questions     Of note: pt has all her medications at home and after thorough medication review with the patient, she stated she did not want any repeat medications sent to the pharmacy. Pt agrees she will  her pain medication and her antibiotic at [facility] on her way out    Additionally: pt noted to have minor erythema surrounding one of abdominal incisions on day of discharge, pt being sent home with clindamycin (confirmed no allergy) for 7 days.       Vital Signs Last 24 Hrs  T(C): 36.8 (02 Mar 2020 10:25), Max: 37.3 (02 Mar 2020 05:09)  T(F): 98.2 (02 Mar 2020 10:25), Max: 99.1 (02 Mar 2020 05:09)  HR: 90 (02 Mar 2020 13:08) (80 - 95)  BP: 112/68 (02 Mar 2020 13:08) (112/68 - 174/86)  BP(mean): 85 (02 Mar 2020 13:08) (85 - 120)  RR: 19 (02 Mar 2020 13:08) (17 - 22)  SpO2: 98% (02 Mar 2020 13:08) (95% - 98%)    EPICARDIAL WIRES REMOVED: n/a  TIE DOWNS REMOVED: n/a    PHYSICAL EXAM:  General: well appearing, sitting up in chair in NAD   Neurological: AOx3. Motor skills grossly intact  Cardiovascular: Normal S1/S2. Regular rate/rhythm. No murmurs  Respiratory: Lungs CTA bilaterally. No wheezing or rales  Gastrointestinal: +BS in all 4 quadrants. Mild distention. Non-tender.   Extremities: Strength 5/5 b/l upper/lower extremities.  No edema. No calf tenderness.  Vascular: Radial 2+bilaterally, DP 2+ b/l  Incision Sites: Abdominal incisions healing well, x1 incision with mild erythema (right lateral incision) but the rest of the incisions healing well, no erythema, purulence or ecchymosis.       LABS: lab holiday today       COUMADIN: no          Discharge CXR:  < from: Xray Chest 1 View- PORTABLE-Routine (03.02.20 @ 04:11) >  Findings/  impression: Stable right central line. Stable heart size, status post median sternotomy and CABG. Right opacity/pleural effusion, resolving. Left opacity/pleural effusion, stable. Stable bony structures. Epigastric surgical clips.  < end of copied text >

## 2020-03-02 NOTE — DISCHARGE NOTE PROVIDER - HOSPITAL COURSE
This is a 79 year old female, with PMHx of hypereosinophilic syndrome, HTN, CAD s/p CABG 1999, hiatal hernia, asthma evaluated by Dr. Ortiz as an outpatient for her type 4 hiatal hernia. She was deemed a surgical candidate and presented to [facility] on 2/25/2020 for elective surgery and  underwent laparoscopic type 4 hiatal hernia repair with Dr. Ortiz. Or course was uncomplicated and she was transferred to  in stable condition. She was started on clear liquid diet on POD#1 which she tolerated well and was advanced to full liquids on POD#2. On POD#2 patient noted to have small left pleural effusion which has remained stable on serial ultrasounds. Pt remained stable medically from POD#3-5 but did not feel ready to be discharged home yet. PT evaluated the pt and she was ordered a walker to go home with. On POD#5 pt is medically ready to be discharged home and feels ready to go home.         Over 35 minutes was spent with the patient reviewing the discharge material including medications, follow up appointments, recovery, concerning symptoms, and how to contact their health care providers if they have questions         Of note: pt has all her medications at home and after thorough medication review with the patient, she stated she did not want any repeat medications sent to the pharmacy. Pt agrees she will  her pain medication and her antibiotic at [facility] on her way out        Additionally: pt noted to have minor erythema surrounding one of abdominal incisions on day of discharge, pt being sent home with clindamycin (confirmed no allergy) for 7 days. This is a 79 year old female, with PMHx of hypereosinophilic syndrome, HTN, CAD s/p CABG 1999, hiatal hernia, asthma evaluated by Dr. Ortiz as an outpatient for her type 4 hiatal hernia. She was deemed a surgical candidate and presented to [facility] on 2/25/2020 for elective surgery and  underwent laparoscopic type 4 hiatal hernia repair with Dr. Ortiz. Or course was uncomplicated and she was transferred to  in stable condition. She was started on clear liquid diet on POD#1 which she tolerated well and was advanced to full liquids on POD#2. On POD#2 patient noted to have small left pleural effusion which has remained stable on serial ultrasounds. Pt remained stable medically from POD#3-5 but did not feel ready to be discharged home yet. PT evaluated the pt and she was ordered a walker to go home with. On POD#5 pt is medically ready to be discharged home and feels ready to go home on a full liquid diet.         Over 35 minutes was spent with the patient reviewing the discharge material including medications, follow up appointments, recovery, concerning symptoms, and how to contact their health care providers if they have questions         Of note: pt has all her medications at home and after thorough medication review with the patient, she stated she did not want any repeat medications sent to the pharmacy. Pt agrees she will  her pain medication and her antibiotic at [facility] on her way out        Additionally: pt noted to have minor erythema surrounding one of abdominal incisions on day of discharge, pt being sent home with clindamycin (confirmed no allergy) for 7 days.

## 2020-03-02 NOTE — PHYSICAL THERAPY INITIAL EVALUATION ADULT - PERTINENT HX OF CURRENT PROBLEM, REHAB EVAL
79 year old female here for EGD, laparopscopy, robotic-assisted, hiatal hernia repair with partial fundoplication, possible mesh and possible gastropexy.

## 2020-03-02 NOTE — PHYSICAL THERAPY INITIAL EVALUATION ADULT - RANGE OF MOTION EXAMINATION, REHAB EVAL
Anemia    Environmental allergies    Fibrocystic breast changes    Fibroid    HTN (hypertension)    Leiomyoma of uterus, unspecified no ROM deficits were identified

## 2020-03-02 NOTE — DISCHARGE NOTE PROVIDER - INSTRUCTIONS
- Please continue with a full liquid diet until you see Dr. Ortiz in the office. Please refer to the handout provided to you.

## 2020-03-02 NOTE — DISCHARGE NOTE PROVIDER - CARE PROVIDER_API CALL
Valdez Ortiz)  Surgery; Thoracic Surgery  [address], [address]th [place] [address]  Phone: [phone]  Fax: [phone]  Scheduled Appointment: 03/12/2020 02:15 PM    Evans Lees)  Internal Medicine; Pulmonary Disease  [address], [address] [place] [address]  Phone: [phone]  Fax: [phone]  Scheduled Appointment: 03/16/2020 01:30 PM Valdez Ortiz)  Surgery; Thoracic Surgery  [address], [address]th [place] [address]  Phone: [phone]  Fax: [phone]  Scheduled Appointment: 03/05/2020 08:15 AM    Evans Lees)  Internal Medicine; Pulmonary Disease  [address], [address] [place] [address]  Phone: [phone]  Fax: [phone]  Scheduled Appointment: 03/16/2020 01:30 PM

## 2020-03-02 NOTE — DISCHARGE NOTE PROVIDER - NSDCCPTREATMENT_GEN_ALL_CORE_FT
PRINCIPAL PROCEDURE  Procedure: Repair, hiatal hernia, laparoscopic, using mesh  Findings and Treatment: EGD, laprascopic, robotic assisted, Type 4 hiatal hernia repair with relaxed incision and mesh

## 2020-03-02 NOTE — DISCHARGE NOTE PROVIDER - CARE PROVIDERS DIRECT ADDRESSES
[email or web address]

## 2020-03-02 NOTE — PROGRESS NOTE ADULT - ASSESSMENT
D/C Instructions:   -Please follow up with Dr. Ortiz on 3/12/2020 at 2:15pm.  The office is located at [facility], [facility], 4th floor. Call us with any questions. Tel: [phone].    -Please follow up with Dr. Lees, your referring physician, on 3/16/2020 at 1:30pm. The office is located at [address] [place] [address], 3rd Floor, [place] [address]. Tel: [phone]    - Please continue with a full liquid diet until you see Dr. Ortiz in the office. Please refer to the handout provided to you.    - No heavy lifting/straining, Showering allowed, Stairs allowed, Walking - Indoors allowed, Walking - Outdoors allowed    -You are being discharged with a 7 day course of an antibiotic. This is because of mild reddening on one of your stomach incisions and is prophylactic coverage to prevent infection. Please take this antibiotic as prescribed.     -You had stated you have all of the other medications you take in the hospital at home already after a thorough medication review. Please take all of your medications as outlined in the this discharge summary and call our office if you have any questions.     -Walk daily as tolerated and use your incentive spirometer every hour.     -No driving or strenuous activity/exercise until cleared by your surgeon.    -Gently clean your incisions with unscented/antibacterial soap and water, pat dry.  You may leave them open to air.    -Call your doctor if you have shortness of breath, chest pain not relieved by pain medication, dizziness, fever >101.5, or increased redness or drainage from incisions.

## 2020-03-02 NOTE — DISCHARGE NOTE PROVIDER - NSDCFUSCHEDAPPT_GEN_ALL_CORE_FT
ZAN JIMENEZ ; 03/12/2020 ; NPP Thorsurg [address]   ZAN JIMENEZ ; 03/16/2020 ; NPP PulmMed [address]   ZAN JIMENEZ ; 03/18/2020 ; [facility] PreAdmits  ZAN JIMENEZ ; 03/18/2020 ; NP Med [facility] [address] ZAN JIMENEZ ; 03/12/2020 ; NPP Thorsurg [address]   ZAN JIMENEZ ; 03/16/2020 ; NPP PulmMed [address]   ZAN JIMENEZ ; 03/18/2020 ; [facility] PreAdmits  ZAN JIMENEZ ; 03/18/2020 ; NP Med [facility] [address] ZAN JIMENEZ ; 03/12/2020 ; [facility] Thorsurg [address]   ZAN JIMENEZ ; 03/16/2020 ; NPP PulmMed [address]   ZAN JIMENEZ ; 03/18/2020 ; [facility] PreAdmits  ZAN JIMENEZ ; 03/18/2020 ; NP Med [facility] [address] ZAN JIMENEZ ; 03/12/2020 ; [facility] Thorsurg [address]   ZAN JIMENEZ ; 03/16/2020 ; NPP PulmMed [address]   ZAN JIMENEZ ; 03/18/2020 ; [facility] PreAdmits  ZAN JIMENEZ ; 03/18/2020 ; NP Med [facility] [address] ZAN JIMENEZ ; 03/12/2020 ; [facility] Thorsurg [address]   ZAN JIMENEZ ; 03/16/2020 ; NPP PulmMed [address]   ZAN JIMENEZ ; 03/18/2020 ; [facility] PreAdmits  ZAN JIMENEZ ; 03/18/2020 ; NP Med [facility] [address]

## 2020-03-02 NOTE — DISCHARGE NOTE NURSING/CASE MANAGEMENT/SOCIAL WORK - PATIENT PORTAL LINK FT
You can access the FollowMyHealth Patient Portal offered by [facility] by registering at the following website: [email or web address]. By joining [facility]’s FollowMyHealth portal, you will also be able to view your health information using other applications (apps) compatible with our system.

## 2020-03-02 NOTE — PHYSICAL THERAPY INITIAL EVALUATION ADULT - SOCIAL CONCERNS
elevator apt with 2 steps x 2 to enter with HR on one side, +doorman who can assist patient with walker

## 2020-03-02 NOTE — DISCHARGE NOTE PROVIDER - NSDCFUADDINST_GEN_ALL_CORE_FT
-You are being discharged with a 7 day course of an antibiotic. This is because of mild reddening on one of your stomach incisions and is prophylactic coverage to prevent infection. Please take this antibiotic as prescribed.     -You had stated you have all of the other medications you take in the hospital at home already after a thorough medication review. Please take all of your medications as outlined in the this discharge summary and call our office if you have any questions.     -Walk daily as tolerated and use your incentive spirometer every hour.     -No driving or strenuous activity/exercise until cleared by your surgeon.    -Gently clean your incisions with unscented/antibacterial soap and water, pat dry.  You may leave them open to air.    -Call your doctor if you have shortness of breath, chest pain not relieved by pain medication, dizziness, fever >101.5, or increased redness or drainage from incisions. -You are being discharged with a 7 day course of an antibiotic. This is because of mild reddening on one of your stomach incisions and is prophylactic coverage to prevent infection. Please take this antibiotic as prescribed.     - Please continue full liquid diet until you see Dr. Ortiz in the office. Please refer to your paperwork as guidelines     -You had stated you have all of the other medications you take in the hospital at home already after a thorough medication review. Please take all of your medications as outlined in the this discharge summary and call our office if you have any questions.     -Walk daily as tolerated and use your incentive spirometer every hour.     -No driving or strenuous activity/exercise until cleared by your surgeon.    -Gently clean your incisions with unscented/antibacterial soap and water, pat dry.  You may leave them open to air.    -Call your doctor if you have shortness of breath, chest pain not relieved by pain medication, dizziness, fever >101.5, or increased redness or drainage from incisions.

## 2020-03-02 NOTE — DISCHARGE NOTE NURSING/CASE MANAGEMENT/SOCIAL WORK - NSDCFUADDAPPT_GEN_ALL_CORE_FT
-Please follow up with Dr. Ortiz on 3/12/2020 at 2:15pm.  The office is located at [facility], [facility], 4th floor. Call us with any questions. Tel: [phone].    -Please follow up with Dr. Lees, your referring physician, on 3/16/2020 at 1:30pm. The office is located at [address] [place] [address], [address] [place] [address]. Tel: [phone]

## 2020-03-02 NOTE — DISCHARGE NOTE PROVIDER - PROVIDER TOKENS
PROVIDER:[TOKEN:[[id number]],SCHEDULEDAPPT:[03/12/2020],SCHEDULEDAPPTTIME:[02:15 PM]],PROVIDER:[TOKEN:[[id number]],SCHEDULEDAPPT:[03/16/2020],SCHEDULEDAPPTTIME:[01:30 PM]] PROVIDER:[TOKEN:[[id number]],SCHEDULEDAPPT:[03/05/2020],SCHEDULEDAPPTTIME:[08:15 AM]],PROVIDER:[TOKEN:[[id number]],SCHEDULEDAPPT:[03/16/2020],SCHEDULEDAPPTTIME:[01:30 PM]]

## 2020-03-02 NOTE — DISCHARGE NOTE PROVIDER - NSDCMRMEDTOKEN_GEN_ALL_CORE_FT
acetaminophen 325 mg oral tablet: 2 tab(s) orally every 6 hours, As needed, Mild Pain (1 - 3) MDD:8 tablets  aluminum hydroxide-magnesium hydroxide 200 mg-200 mg/5 mL oral suspension: 30 milliliter(s) orally every 6 hours, As needed, Dyspepsia  Arcapta Neohaler 75 mcg inhalation capsule: 1 cap(s) inhaled once a day  Aspir 81 oral delayed release tablet: orally every other day  clindamycin 300 mg oral capsule: 1 cap(s) orally every 6 hours  .   Fasenra Pen 30 mg/mL subcutaneous solution: subcutaneous every 8 weeks  Flovent 110 mcg/inh inhalation aerosol with adapter:   irbesartan 150 mg oral tablet: orally once a day (at bedtime)  LORazepam 0.5 mg oral tablet: 0.5 tab(s) orally once a day  polyethylene glycol 3350 oral powder for reconstitution: 17 gram(s) orally once a day  ProAir HFA 90 mcg/inh inhalation aerosol:   rosuvastatin 10 mg oral tablet: 1 tab(s) orally once a day (at bedtime)  senna oral tablet: 2 tab(s) orally once a day (at bedtime)  sertraline 25 mg oral tablet: 0.5 tab(s) orally once a day  simethicone 80 mg oral tablet, chewable: 1 tab(s) orally every 6 hours  Zetia 10 mg oral tablet: 1 tab(s) orally once a day (at bedtime)

## 2020-03-02 NOTE — PHYSICAL THERAPY INITIAL EVALUATION ADULT - GAIT DEVIATIONS NOTED, PT EVAL
fairly steady, no loss of balance, min AMBRIZ, limited by c/o fatigue; 5 feet without an assistive device with contact guard, slightly unsteady, decreased rissa, no loss of balance

## 2020-03-02 NOTE — DISCHARGE NOTE PROVIDER - NSDCFUADDAPPT_GEN_ALL_CORE_FT
-Please follow up with Dr. Ortiz on 3/12/2020 at 2:15pm.  The office is located at [facility], [facility], 4th floor. Call us with any questions. Tel: [phone].    -Please follow up with Dr. Lees, your referring physician, on 3/16/2020 at 1:30pm. The office is located at [address] [place] [address], [address] [place] [address]. Tel: [phone] -Please follow up with Dr. Ortiz on 3/05/2020 at 8:15am.  The office is located at [facility], [facility], 4th floor. Call us with any questions. Tel: [phone].    -Please follow up with Dr. Lees, your referring physician, on 3/16/2020 at 1:30pm. The office is located at [address] [place] [address], [address] [place] [address]. Tel: [phone]

## 2020-03-03 PROCEDURE — 97161 PT EVAL LOW COMPLEX 20 MIN: CPT

## 2020-03-03 PROCEDURE — 83036 HEMOGLOBIN GLYCOSYLATED A1C: CPT

## 2020-03-03 PROCEDURE — C9399: CPT

## 2020-03-03 PROCEDURE — 36415 COLL VENOUS BLD VENIPUNCTURE: CPT

## 2020-03-03 PROCEDURE — C1781: CPT

## 2020-03-03 PROCEDURE — 80048 BASIC METABOLIC PNL TOTAL CA: CPT

## 2020-03-03 PROCEDURE — 86901 BLOOD TYPING SEROLOGIC RH(D): CPT

## 2020-03-03 PROCEDURE — 84443 ASSAY THYROID STIM HORMONE: CPT

## 2020-03-03 PROCEDURE — 85730 THROMBOPLASTIN TIME PARTIAL: CPT

## 2020-03-03 PROCEDURE — 86850 RBC ANTIBODY SCREEN: CPT

## 2020-03-03 PROCEDURE — 85027 COMPLETE CBC AUTOMATED: CPT

## 2020-03-03 PROCEDURE — 85610 PROTHROMBIN TIME: CPT

## 2020-03-03 PROCEDURE — 71045 X-RAY EXAM CHEST 1 VIEW: CPT

## 2020-03-03 PROCEDURE — 83735 ASSAY OF MAGNESIUM: CPT

## 2020-03-03 PROCEDURE — 94640 AIRWAY INHALATION TREATMENT: CPT

## 2020-03-03 PROCEDURE — S2900: CPT

## 2020-03-04 ENCOUNTER — FORM ENCOUNTER (OUTPATIENT)
Age: 80
End: 2020-03-04

## 2020-03-05 ENCOUNTER — APPOINTMENT (OUTPATIENT)
Dept: THORACIC SURGERY | Facility: CLINIC | Age: 80
End: 2020-03-05
Payer: MEDICARE

## 2020-03-05 ENCOUNTER — OUTPATIENT (OUTPATIENT)
Dept: OUTPATIENT SERVICES | Facility: HOSPITAL | Age: 80
LOS: 1 days | End: 2020-03-05
Payer: MEDICARE

## 2020-03-05 VITALS
RESPIRATION RATE: 18 BRPM | HEIGHT: 59 IN | BODY MASS INDEX: 25.8 KG/M2 | WEIGHT: 128 LBS | TEMPERATURE: 96.8 F | DIASTOLIC BLOOD PRESSURE: 69 MMHG | SYSTOLIC BLOOD PRESSURE: 133 MMHG | HEART RATE: 86 BPM | OXYGEN SATURATION: 94 %

## 2020-03-05 DIAGNOSIS — Z98.890 OTHER SPECIFIED POSTPROCEDURAL STATES: Chronic | ICD-10-CM

## 2020-03-05 DIAGNOSIS — Z95.1 PRESENCE OF AORTOCORONARY BYPASS GRAFT: Chronic | ICD-10-CM

## 2020-03-05 PROCEDURE — 71046 X-RAY EXAM CHEST 2 VIEWS: CPT

## 2020-03-05 PROCEDURE — 99024 POSTOP FOLLOW-UP VISIT: CPT

## 2020-03-05 PROCEDURE — 71046 X-RAY EXAM CHEST 2 VIEWS: CPT | Mod: 26

## 2020-03-06 DIAGNOSIS — I25.10 ATHEROSCLEROTIC HEART DISEASE OF NATIVE CORONARY ARTERY WITHOUT ANGINA PECTORIS: ICD-10-CM

## 2020-03-06 DIAGNOSIS — R07.9 CHEST PAIN, UNSPECIFIED: ICD-10-CM

## 2020-03-06 DIAGNOSIS — J45.909 UNSPECIFIED ASTHMA, UNCOMPLICATED: ICD-10-CM

## 2020-03-06 DIAGNOSIS — Z88.1 ALLERGY STATUS TO OTHER ANTIBIOTIC AGENTS STATUS: ICD-10-CM

## 2020-03-06 DIAGNOSIS — I10 ESSENTIAL (PRIMARY) HYPERTENSION: ICD-10-CM

## 2020-03-06 DIAGNOSIS — K44.9 DIAPHRAGMATIC HERNIA WITHOUT OBSTRUCTION OR GANGRENE: ICD-10-CM

## 2020-03-06 DIAGNOSIS — D72.1 EOSINOPHILIA: ICD-10-CM

## 2020-03-06 DIAGNOSIS — L53.8 OTHER SPECIFIED ERYTHEMATOUS CONDITIONS: ICD-10-CM

## 2020-03-06 DIAGNOSIS — J90 PLEURAL EFFUSION, NOT ELSEWHERE CLASSIFIED: ICD-10-CM

## 2020-03-06 DIAGNOSIS — J44.9 CHRONIC OBSTRUCTIVE PULMONARY DISEASE, UNSPECIFIED: ICD-10-CM

## 2020-03-06 DIAGNOSIS — K31.89 OTHER DISEASES OF STOMACH AND DUODENUM: ICD-10-CM

## 2020-03-06 DIAGNOSIS — E78.5 HYPERLIPIDEMIA, UNSPECIFIED: ICD-10-CM

## 2020-03-06 DIAGNOSIS — Z95.1 PRESENCE OF AORTOCORONARY BYPASS GRAFT: ICD-10-CM

## 2020-03-06 DIAGNOSIS — E21.5 DISORDER OF PARATHYROID GLAND, UNSPECIFIED: ICD-10-CM

## 2020-03-13 PROBLEM — Z09 POSTOP CHECK: Status: ACTIVE | Noted: 2020-03-03

## 2020-03-16 ENCOUNTER — APPOINTMENT (OUTPATIENT)
Dept: PULMONOLOGY | Facility: CLINIC | Age: 80
End: 2020-03-16

## 2020-03-19 ENCOUNTER — APPOINTMENT (OUTPATIENT)
Dept: THORACIC SURGERY | Facility: CLINIC | Age: 80
End: 2020-03-19

## 2020-03-19 DIAGNOSIS — Z09 ENCOUNTER FOR FOLLOW-UP EXAMINATION AFTER COMPLETED TREATMENT FOR CONDITIONS OTHER THAN MALIGNANT NEOPLASM: ICD-10-CM

## 2020-04-09 ENCOUNTER — APPOINTMENT (OUTPATIENT)
Age: 80
End: 2020-04-09

## 2020-04-16 ENCOUNTER — OUTPATIENT (OUTPATIENT)
Dept: OUTPATIENT SERVICES | Facility: HOSPITAL | Age: 80
LOS: 1 days | End: 2020-04-16
Payer: MEDICARE

## 2020-04-16 VITALS
OXYGEN SATURATION: 97 % | HEART RATE: 75 BPM | TEMPERATURE: 99 F | DIASTOLIC BLOOD PRESSURE: 69 MMHG | SYSTOLIC BLOOD PRESSURE: 118 MMHG | RESPIRATION RATE: 16 BRPM

## 2020-04-16 DIAGNOSIS — Z95.1 PRESENCE OF AORTOCORONARY BYPASS GRAFT: Chronic | ICD-10-CM

## 2020-04-16 DIAGNOSIS — Z98.890 OTHER SPECIFIED POSTPROCEDURAL STATES: Chronic | ICD-10-CM

## 2020-04-16 DIAGNOSIS — J45.50 SEVERE PERSISTENT ASTHMA, UNCOMPLICATED: ICD-10-CM

## 2020-04-16 PROCEDURE — 96372 THER/PROPH/DIAG INJ SC/IM: CPT

## 2020-04-16 RX ORDER — BENRALIZUMAB 30 MG/ML
30 INJECTION, SOLUTION SUBCUTANEOUS ONCE
Refills: 0 | Status: COMPLETED | OUTPATIENT
Start: 2020-04-16 | End: 2020-04-16

## 2020-04-16 RX ADMIN — BENRALIZUMAB 30 MILLIGRAM(S): 30 INJECTION, SOLUTION SUBCUTANEOUS at 13:00

## 2020-04-20 NOTE — PHYSICAL EXAM
Noted thanks   [Antalgic] : antalgic [ALL] : dorsalis pedis, posterior tibial, femoral, popliteal, and radial 2+ and symmetric bilaterally [Normal] : Oriented to person, place, and time, insight and judgement were intact and the affect was normal [de-identified] : The patient is a well developed, well nourished female in no apparent distress. She is alert and oriented X 3 with a pleasant mood and appropriate affect. \par \par On physical examination of the left knee, there is full range of motion. The patient walks with a slight limpot and stands in neutral alignment. There is no effusion. No warmth or erythema is noted. The patella is tender to palpation medially and laterally. There is patellofemoral crepitus noted. The apprehension and grind tests are negative. The extensor mechanism is intact. There is no joint line tenderness. The Carin sign is absent. The Lachman and pivot shift tests are negative. There is no varus or valgus laxity at 0 or 30 degrees. No posterolateral or anteromedial laxity is noted. No masses are palpable. No other soft tissue or bony tenderness is noted. There is some tenderness noted on palpation of the IT band. Quadriceps weakness is noted. Neurovascular function is intact.   [de-identified] : Radiographs show well aligned and well maintained joint spacing bilaterally

## 2020-05-28 ENCOUNTER — APPOINTMENT (OUTPATIENT)
Dept: THORACIC SURGERY | Facility: CLINIC | Age: 80
End: 2020-05-28

## 2020-06-11 ENCOUNTER — APPOINTMENT (OUTPATIENT)
Age: 80
End: 2020-06-11

## 2020-07-21 ENCOUNTER — APPOINTMENT (OUTPATIENT)
Age: 80
End: 2020-07-21

## 2020-07-21 ENCOUNTER — OUTPATIENT (OUTPATIENT)
Dept: OUTPATIENT SERVICES | Facility: HOSPITAL | Age: 80
LOS: 1 days | End: 2020-07-21
Payer: MEDICARE

## 2020-07-21 VITALS
SYSTOLIC BLOOD PRESSURE: 128 MMHG | HEART RATE: 78 BPM | OXYGEN SATURATION: 97 % | DIASTOLIC BLOOD PRESSURE: 73 MMHG | TEMPERATURE: 98 F | RESPIRATION RATE: 18 BRPM

## 2020-07-21 DIAGNOSIS — Z98.890 OTHER SPECIFIED POSTPROCEDURAL STATES: Chronic | ICD-10-CM

## 2020-07-21 DIAGNOSIS — Z95.1 PRESENCE OF AORTOCORONARY BYPASS GRAFT: Chronic | ICD-10-CM

## 2020-07-21 DIAGNOSIS — J45.50 SEVERE PERSISTENT ASTHMA, UNCOMPLICATED: ICD-10-CM

## 2020-07-21 PROCEDURE — 96372 THER/PROPH/DIAG INJ SC/IM: CPT

## 2020-07-21 RX ORDER — BENRALIZUMAB 30 MG/ML
30 INJECTION, SOLUTION SUBCUTANEOUS ONCE
Refills: 0 | Status: COMPLETED | OUTPATIENT
Start: 2020-07-21 | End: 2020-07-21

## 2020-07-21 RX ADMIN — BENRALIZUMAB 30 MILLIGRAM(S): 30 INJECTION, SOLUTION SUBCUTANEOUS at 14:05

## 2020-07-30 DIAGNOSIS — M22.2X2 PATELLOFEMORAL DISORDERS, LEFT KNEE: ICD-10-CM

## 2020-09-14 RX ORDER — BENRALIZUMAB 30 MG/ML
30 INJECTION, SOLUTION SUBCUTANEOUS ONCE
Refills: 0 | Status: COMPLETED | OUTPATIENT
Start: 2021-06-02 | End: 2021-06-02

## 2020-09-15 ENCOUNTER — APPOINTMENT (OUTPATIENT)
Age: 80
End: 2020-09-15

## 2020-10-01 PROBLEM — D72.119 HYPEREOSINOPHILIC SYNDROME: Status: ACTIVE | Noted: 2017-10-03

## 2020-10-13 ENCOUNTER — APPOINTMENT (OUTPATIENT)
Age: 80
End: 2020-10-13

## 2020-10-13 ENCOUNTER — OUTPATIENT (OUTPATIENT)
Dept: OUTPATIENT SERVICES | Facility: HOSPITAL | Age: 80
LOS: 1 days | End: 2020-10-13
Payer: MEDICARE

## 2020-10-13 VITALS
WEIGHT: 139.99 LBS | OXYGEN SATURATION: 98 % | HEART RATE: 73 BPM | SYSTOLIC BLOOD PRESSURE: 140 MMHG | HEIGHT: 58 IN | DIASTOLIC BLOOD PRESSURE: 78 MMHG | TEMPERATURE: 98 F | RESPIRATION RATE: 16 BRPM

## 2020-10-13 DIAGNOSIS — Z95.1 PRESENCE OF AORTOCORONARY BYPASS GRAFT: Chronic | ICD-10-CM

## 2020-10-13 DIAGNOSIS — J45.50 SEVERE PERSISTENT ASTHMA, UNCOMPLICATED: ICD-10-CM

## 2020-10-13 DIAGNOSIS — Z98.890 OTHER SPECIFIED POSTPROCEDURAL STATES: Chronic | ICD-10-CM

## 2020-10-13 PROCEDURE — 96372 THER/PROPH/DIAG INJ SC/IM: CPT

## 2020-10-13 RX ORDER — BENRALIZUMAB 30 MG/ML
30 INJECTION, SOLUTION SUBCUTANEOUS ONCE
Refills: 0 | Status: COMPLETED | OUTPATIENT
Start: 2020-10-13 | End: 2020-10-13

## 2020-10-13 RX ADMIN — BENRALIZUMAB 30 MILLIGRAM(S): 30 INJECTION, SOLUTION SUBCUTANEOUS at 13:15

## 2020-10-28 ENCOUNTER — APPOINTMENT (OUTPATIENT)
Dept: ORTHOPEDIC SURGERY | Facility: CLINIC | Age: 80
End: 2020-10-28
Payer: MEDICARE

## 2020-10-28 VITALS
HEIGHT: 59 IN | HEART RATE: 76 BPM | SYSTOLIC BLOOD PRESSURE: 130 MMHG | DIASTOLIC BLOOD PRESSURE: 70 MMHG | WEIGHT: 132 LBS | BODY MASS INDEX: 26.61 KG/M2 | OXYGEN SATURATION: 98 %

## 2020-10-28 PROCEDURE — 99214 OFFICE O/P EST MOD 30 MIN: CPT | Mod: 25

## 2020-10-28 PROCEDURE — 20611 DRAIN/INJ JOINT/BURSA W/US: CPT | Mod: 50

## 2020-10-28 PROCEDURE — 73564 X-RAY EXAM KNEE 4 OR MORE: CPT | Mod: 50

## 2020-10-28 RX ORDER — SERTRALINE HYDROCHLORIDE 25 MG/1
TABLET, FILM COATED ORAL
Refills: 0 | Status: DISCONTINUED | COMMUNITY
End: 2020-10-28

## 2020-10-28 RX ORDER — LORAZEPAM 0.5 MG/1
0.5 TABLET ORAL
Refills: 0 | Status: DISCONTINUED | COMMUNITY
End: 2020-10-28

## 2020-10-28 RX ORDER — ALBUTEROL SULFATE 90 UG/1
108 (90 BASE) AEROSOL, METERED RESPIRATORY (INHALATION)
Qty: 3 | Refills: 3 | Status: DISCONTINUED | COMMUNITY
Start: 2018-04-23 | End: 2020-10-28

## 2020-10-28 NOTE — PROCEDURE
[de-identified] : Ultrasound-Guided RIGHT Knee Injection\par \par Indication for U/S Guidance: Ensure placement within the tibiofemoral joint for diagnostic purposes, while avoiding neurovascular structures\par \par Indication for Injection: KNEE OSTEOARTHRITIS\par \par A discussion was had with the patient regarding this procedure and all questions were answered. All risks, benefits and alternatives were discussed. These included but were not limited to bleeding, infection, and allergic reaction. A timeout was done to ensure correct side and pt agreed to the procedure. Betadine was used to sterilize and prep the area, and alcohol was used to clean the skin in the anterior aspect of the knee joint. The suprapatellar space was visualized utilizing the Sonosite, linear transducer. The joint was visualized in the short axis and an in-plane approach was used for the injection. Ultrasound guidance was utilized to ensure accuracy of the intra-articular injection and avoid the neurovascular structures. A 22-gauge 1.5" needle was used to inject 2cc of 1% lidocaine without epi into the SubQ.  This was followed by injection with 4CC OF MONOVISC.   An image confirming the correct location of the needle placement and infusion of the steroid at the end of the injection was saved. A sterile bandage was then applied. The patient tolerated the procedure well and there were no complications.\par \par \par Lot #:  [id number]\par Exp:2022-04-30\par \par Ultrasound-Guided LEFT Knee Injection\par \par Indication for U/S Guidance: Ensure placement within the tibiofemoral joint for diagnostic purposes, while avoiding neurovascular structures\par \par Indication for Injection: KNEE OSTEOARTHRITIS\par \par A discussion was had with the patient regarding this procedure and all questions were answered. All risks, benefits and alternatives were discussed. These included but were not limited to bleeding, infection, and allergic reaction. A timeout was done to ensure correct side and pt agreed to the procedure. Betadine was used to sterilize and prep the area, and alcohol was used to clean the skin in the anterior aspect of the knee joint. The suprapatellar space was visualized utilizing the Sonosite, linear transducer. The joint was visualized in the short axis and an in-plane approach was used for the injection. Ultrasound guidance was utilized to ensure accuracy of the intra-articular injection and avoid the neurovascular structures. A 22-gauge 1.5" needle was used to inject 2cc of 1% lidocaine without epi into the SubQ.  This was followed by injection with 4CC OF MONOVISC.   An image confirming the correct location of the needle placement and infusion of the steroid at the end of the injection was saved. A sterile bandage was then applied. The patient tolerated the procedure well and there were no complications.\par \par \par Lot #:  [id number]\par Exp:2022-09-30

## 2020-10-28 NOTE — HISTORY OF PRESENT ILLNESS
[de-identified] : The patient is a 79 year old woman presenting with right knee pain.\par \par She has a history of left knee patellofemoral pain syndrome/arthritis, treated with PT and cortisone.\par \par She presents with chronic, atraumatic, bilateral knee pain, right worse than left.  Pain mostly localized to anteromedial knee.  The patient denies mechanical symptoms including catching, locking, buckling.  She has occasional swelling and stiffness.  She denies focal hip or low back pain.  She enjoys driving, and has some knee pain with prolonged sitting.  She has responded to injections in the past.\par \par Pain is rated 4/10, described as achy, improved with rest, worse with prolonged sitting. [4] : a current pain level of 4/10

## 2020-10-28 NOTE — PHYSICAL EXAM
[de-identified] : General: Well-nourished, well-developed, alert, and in no acute distress.\par Head: Normocephalic.\par Eyes: Pupils equal round reactive to light and accommodation, extraocular muscles intact, normal sclera.\par Nose: No nasal discharge.\par Cardiac: Regular rate. Extremities are warm and well perfused. Distal pulses are symmetric bilaterally.\par Respiratory: No labored breathing.\par Extremities: Sensation is intact distally bilaterally.  Distal pulses are symmetric bilaterally\par Lymphatic: No regional lymphadenopathy, no lymphedema\par Neurologic: No focal deficits\par Skin: Normal skin color, texture, and turgor\par Psychiatric: Normal affect\par MSK: as noted above/below\par \par \par \par RIGHT KNEE:\par \par Inspection: no bruising, erythema\par Joint Effusion:TRACE\par ROM: Knee Flexion 130 , Knee Extension 0\par Palpation:PERIPATELLAR PAIN, MEDIAL JOINT LINE PAIN, No pain at patellar tendon, MFC/LFC, Medial/Lateral Tibial Plateau\par Leg Length Discrepancy:no\par Patella: no apprehension, +COMPRESSION\par Distal Pulses: normal\par Lower Extremity Strength:normal, 5/5 \par Lower Extremity Reflexes:normal, 2+\par Lower Extremity Sensation: normal\par \par Special Tests:\par [facility] [place]:Negative \par [place]: Negative\par Anterior Drawer:Negative\par Posterior Drawer:Negative \par Varus/Valgus:Negative, no instability\par \par LEFT KNEE:\par \par Inspection: no bruising, swelling, erythema\par Joint Effusion:no \par ROM: Knee Flexion 130 , Knee Extension 0\par Palpation:MEDIAL JOINT LINE PAIN, PERIPATELLAR PAIN, No pain at patellar tendon, MFC/LFC, Medial/Lateral Tibial Plateau\par Leg Length Discrepancy:no\par Patella: no apprehension\par Distal Pulses: normal\par Lower Extremity Strength:normal, 5/5 \par Lower Extremity Reflexes:normal, 2+\par Lower Extremity Sensation: normal\par \par Special Tests:\par [facility] [place]:Negative \par [place]: Negative\par Anterior Drawer:Negative\par Posterior Drawer:Negative \par Varus/Valgus:Negative, no instability\par  [de-identified] : Xray Bilateral Knees - Multiple views were reviewed with the patient in detail.  There is no acute fracture or dislocation.  There is no joint effusion.  There is left lateral compartment narrowing and mild right lateral patellofemoral compartment narrowing.  Evidence of vascular clips.\par \par

## 2020-10-28 NOTE — DISCUSSION/SUMMARY
[Medication Risks Reviewed] : Medication risks reviewed [de-identified] : The patient is a 79 year old woman presenting with chronic, bilateral knee pain likely secondary to mild knee osteoarthritis.\par \par Imaging/Diagnostics were interpreted and results were reviewed with the patient in detail.  All questions were answered appropriately.\par \par I discussed the treatment of degenerative arthritis with the patient at length today. I described the spectrum of treatment from nonoperative modalities to total joint arthroplasty. Noninvasive and nonoperative treatment modalities include weight reduction, activity modification with low impact exercise, PRN use of acetaminophen or anti-inflammatory medication if tolerated, natural supplements such as glucosamine/chondroitin, and physical therapy. Further treatments can include corticosteroid injection, hyaluronic acid injections, and orthobiologic such as PRP. Definitive treatment can certainly include total joint arthroplasty, but patient would require surgical consultation to discuss that option further. The risks and benefits of each treatment options was discussed and all questions were answered.\par \par \par After informed consent, and explanation of risks, benefits, alternatives, adverse effects of injection, which includes but is not limited to infection, bleeding, allergic reaction, swelling, soft tissue weakening/tendon rupture, injection site complication, fat atrophy, skin depigmentation, failure to improve symptoms, the patient would like to proceed with the procedure - BILATERAL KNEE ULTRASOUND-GUIDED MONOVISC INJECTIONS. See procedure note above. Patient tolerated the procedure well. The patient was provided with postinjection instructions.\par \par The patient was also provided some general home exercises.  The patient was counseled on activity modification.\par \par Follow-up in 3-6 months.\par \par ------------------------------------------------------------------------------------------------------------------\par Patient appreciates and agrees with current plan.\par \par This note was generated using a mixture of manual typing and dragon medical dictation software.  A reasonable effort has been made for proofreading its contents, but typos may still remain.  If there are any questions or points of clarification needed please notify my office.\par \par >45 minutes of time was spent on total encounter.  >50% of the visit was spent on counseling/coordination of care and medical-decision making for this patient.\par \par

## 2020-11-05 ENCOUNTER — LABORATORY RESULT (OUTPATIENT)
Age: 80
End: 2020-11-05

## 2020-11-10 ENCOUNTER — APPOINTMENT (OUTPATIENT)
Dept: PULMONOLOGY | Facility: CLINIC | Age: 80
End: 2020-11-10
Payer: MEDICARE

## 2020-11-10 ENCOUNTER — APPOINTMENT (OUTPATIENT)
Age: 80
End: 2020-11-10

## 2020-11-10 VITALS
HEIGHT: 59 IN | HEART RATE: 84 BPM | SYSTOLIC BLOOD PRESSURE: 121 MMHG | WEIGHT: 135 LBS | TEMPERATURE: 97.7 F | OXYGEN SATURATION: 97 % | DIASTOLIC BLOOD PRESSURE: 73 MMHG | BODY MASS INDEX: 27.21 KG/M2

## 2020-11-10 PROCEDURE — 71046 X-RAY EXAM CHEST 2 VIEWS: CPT

## 2020-11-10 PROCEDURE — 94010 BREATHING CAPACITY TEST: CPT

## 2020-11-10 PROCEDURE — 99214 OFFICE O/P EST MOD 30 MIN: CPT | Mod: 25

## 2020-11-11 NOTE — PHYSICAL EXAM
[No Acute Distress] : no acute distress [Normal Oropharynx] : normal oropharynx [Normal Appearance] : normal appearance [No Neck Mass] : no neck mass [Normal Rate/Rhythm] : normal rate/rhythm [Normal S1, S2] : normal s1, s2 [No Murmurs] : no murmurs [No Abnormalities] : no abnormalities [Normal Gait] : normal gait [No Clubbing] : no clubbing [No Edema] : no edema

## 2020-11-11 NOTE — REASON FOR VISIT
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

## 2020-11-11 NOTE — DISCUSSION/SUMMARY
[FreeTextEntry1] : she is remarkably better,  reportedly her eos are fine.\par \par reduced her meds to flovent 44, once a day\par \par and prn albuterol \par \par not sure with fasenra how aggressively i need to treat her asthma.\par \par will return in amonths time

## 2020-11-11 NOTE — HISTORY OF PRESENT ILLNESS
[TextBox_4] : despite feeling much better her anxiety and worrying has not diminished however, her chest film is excellent and her asthma is in great control.  and she feels well,

## 2020-11-12 NOTE — PATIENT PROFILE ADULT - HOME ACCESSIBILITY CONCERNS
Date/Time Patient Seen:  		  Referring MD:   Data Reviewed	       Patient is a 71y old  Male who presents with a chief complaint of ALTERED MENTAL STATUS  AND LACTICEMIA (11 Nov 2020 19:15)      Subjective/HPI     PAST MEDICAL & SURGICAL HISTORY:  Paranoid personality disorder    Anxiety    Dementia    Alzheimer disease    Hypertension    Hyperlipidemia    Diabetes mellitus    Afib    DM (diabetes mellitus)    DM (diabetes mellitus)          Medication list         MEDICATIONS  (STANDING):  aspirin 325 milliGRAM(s) Oral daily  atorvastatin 40 milliGRAM(s) Oral at bedtime  dextrose 40% Gel 15 Gram(s) Oral once  dextrose 5%. 1000 milliLiter(s) (100 mL/Hr) IV Continuous <Continuous>  dextrose 5%. 1000 milliLiter(s) (50 mL/Hr) IV Continuous <Continuous>  dextrose 50% Injectable 25 Gram(s) IV Push once  dextrose 50% Injectable 12.5 Gram(s) IV Push once  dextrose 50% Injectable 25 Gram(s) IV Push once  diltiazem    milliGRAM(s) Oral daily  donepezil 10 milliGRAM(s) Oral at bedtime  famotidine    Tablet 20 milliGRAM(s) Oral daily  glucagon  Injectable 1 milliGRAM(s) IntraMuscular once  heparin   Injectable 5000 Unit(s) SubCutaneous every 12 hours  insulin lispro (ADMELOG) corrective regimen sliding scale   SubCutaneous three times a day before meals  loratadine 10 milliGRAM(s) Oral daily  memantine 5 milliGRAM(s) Oral two times a day  metoprolol tartrate 25 milliGRAM(s) Oral two times a day  OLANZapine Disintegrating Tablet 10 milliGRAM(s) Oral three times a day  potassium phosphate / sodium phosphate Powder (PHOS-NaK) 1 Packet(s) Oral two times a day  sodium chloride 0.9%. 1000 milliLiter(s) (50 mL/Hr) IV Continuous <Continuous>    MEDICATIONS  (PRN):  acetaminophen   Tablet .. 650 milliGRAM(s) Oral every 6 hours PRN Temp greater or equal to 38C (100.4F), Mild Pain (1 - 3)  LORazepam   Injectable 0.5 milliGRAM(s) IV Push every 6 hours PRN SEVERE AGITATION  melatonin 5 milliGRAM(s) Oral at bedtime PRN Insomnia  OLANZapine Injectable 5 milliGRAM(s) IntraMuscular every 6 hours PRN agitation         Vitals log        ICU Vital Signs Last 24 Hrs  T(C): 37.1 (12 Nov 2020 05:00), Max: 37.3 (11 Nov 2020 20:36)  T(F): 98.7 (12 Nov 2020 05:00), Max: 99.1 (11 Nov 2020 20:36)  HR: 63 (12 Nov 2020 05:00) (63 - 90)  BP: 114/72 (12 Nov 2020 05:00) (114/72 - 160/69)  BP(mean): --  ABP: --  ABP(mean): --  RR: 18 (12 Nov 2020 05:00) (17 - 19)  SpO2: 94% (12 Nov 2020 05:00) (94% - 96%)           Input and Output:  I&O's Detail    10 Nov 2020 07:01  -  11 Nov 2020 07:00  --------------------------------------------------------  IN:    sodium chloride 0.9%: 400 mL  Total IN: 400 mL    OUT:  Total OUT: 0 mL    Total NET: 400 mL          Lab Data                        11.0   5.06  )-----------( 160      ( 11 Nov 2020 05:52 )             31.8     11-11    145  |  113<H>  |  14  ----------------------------<  129<H>  3.5   |  26  |  0.56    Ca    8.4<L>      11 Nov 2020 05:52  Phos  2.4     11-11    TPro  6.2  /  Alb  3.5  /  TBili  0.5  /  DBili  x   /  AST  25  /  ALT  15  /  AlkPhos  41  11-11      CARDIAC MARKERS ( 11 Nov 2020 18:55 )  .278 ng/mL / x     / x     / x     / x      CARDIAC MARKERS ( 11 Nov 2020 05:52 )  .053 ng/mL / x     / x     / x     / x            Review of Systems	      Objective     Physical Examination    heart s1s2  lung dc BS  abd soft  on RA  frail  verbal      Pertinent Lab findings & Imaging      Bianca:  NO   Adequate UO     I&O's Detail    10 Nov 2020 07:01  -  11 Nov 2020 07:00  --------------------------------------------------------  IN:    sodium chloride 0.9%: 400 mL  Total IN: 400 mL    OUT:  Total OUT: 0 mL    Total NET: 400 mL               Discussed with:     Cultures:	        Radiology                             none

## 2021-01-12 ENCOUNTER — APPOINTMENT (OUTPATIENT)
Age: 81
End: 2021-01-12

## 2021-01-12 ENCOUNTER — OUTPATIENT (OUTPATIENT)
Dept: OUTPATIENT SERVICES | Facility: HOSPITAL | Age: 81
LOS: 1 days | End: 2021-01-12
Payer: MEDICARE

## 2021-01-12 VITALS
OXYGEN SATURATION: 96 % | DIASTOLIC BLOOD PRESSURE: 75 MMHG | SYSTOLIC BLOOD PRESSURE: 119 MMHG | TEMPERATURE: 98 F | RESPIRATION RATE: 18 BRPM | HEART RATE: 76 BPM

## 2021-01-12 DIAGNOSIS — Z98.890 OTHER SPECIFIED POSTPROCEDURAL STATES: Chronic | ICD-10-CM

## 2021-01-12 DIAGNOSIS — J45.50 SEVERE PERSISTENT ASTHMA, UNCOMPLICATED: ICD-10-CM

## 2021-01-12 DIAGNOSIS — Z95.1 PRESENCE OF AORTOCORONARY BYPASS GRAFT: Chronic | ICD-10-CM

## 2021-01-12 PROCEDURE — 96372 THER/PROPH/DIAG INJ SC/IM: CPT

## 2021-01-12 RX ORDER — BENRALIZUMAB 30 MG/ML
30 INJECTION, SOLUTION SUBCUTANEOUS ONCE
Refills: 0 | Status: COMPLETED | OUTPATIENT
Start: 2021-01-12 | End: 2021-01-12

## 2021-01-12 RX ADMIN — BENRALIZUMAB 30 MILLIGRAM(S): 30 INJECTION, SOLUTION SUBCUTANEOUS at 13:05

## 2021-02-21 LAB — SARS-COV-2 N GENE NPH QL NAA+PROBE: NOT DETECTED

## 2021-02-23 ENCOUNTER — APPOINTMENT (OUTPATIENT)
Dept: PULMONOLOGY | Facility: CLINIC | Age: 81
End: 2021-02-23

## 2021-03-23 ENCOUNTER — APPOINTMENT (OUTPATIENT)
Age: 81
End: 2021-03-23

## 2021-03-23 ENCOUNTER — OUTPATIENT (OUTPATIENT)
Dept: OUTPATIENT SERVICES | Facility: HOSPITAL | Age: 81
LOS: 1 days | End: 2021-03-23
Payer: MEDICARE

## 2021-03-23 VITALS
SYSTOLIC BLOOD PRESSURE: 131 MMHG | HEIGHT: 59 IN | WEIGHT: 138.89 LBS | OXYGEN SATURATION: 97 % | RESPIRATION RATE: 18 BRPM | TEMPERATURE: 98 F | HEART RATE: 74 BPM | DIASTOLIC BLOOD PRESSURE: 73 MMHG

## 2021-03-23 DIAGNOSIS — Z95.1 PRESENCE OF AORTOCORONARY BYPASS GRAFT: Chronic | ICD-10-CM

## 2021-03-23 DIAGNOSIS — J45.50 SEVERE PERSISTENT ASTHMA, UNCOMPLICATED: ICD-10-CM

## 2021-03-23 DIAGNOSIS — Z98.890 OTHER SPECIFIED POSTPROCEDURAL STATES: Chronic | ICD-10-CM

## 2021-03-23 PROCEDURE — 96372 THER/PROPH/DIAG INJ SC/IM: CPT

## 2021-03-23 RX ORDER — BENRALIZUMAB 30 MG/ML
30 INJECTION, SOLUTION SUBCUTANEOUS ONCE
Refills: 0 | Status: COMPLETED | OUTPATIENT
Start: 2021-03-23 | End: 2021-03-23

## 2021-03-23 RX ADMIN — BENRALIZUMAB 30 MILLIGRAM(S): 30 INJECTION, SOLUTION SUBCUTANEOUS at 13:40

## 2021-04-13 ENCOUNTER — APPOINTMENT (OUTPATIENT)
Dept: PULMONOLOGY | Facility: CLINIC | Age: 81
End: 2021-04-13
Payer: MEDICARE

## 2021-04-13 ENCOUNTER — NON-APPOINTMENT (OUTPATIENT)
Age: 81
End: 2021-04-13

## 2021-04-13 VITALS
HEART RATE: 81 BPM | DIASTOLIC BLOOD PRESSURE: 63 MMHG | HEIGHT: 59 IN | OXYGEN SATURATION: 97 % | WEIGHT: 139 LBS | BODY MASS INDEX: 28.02 KG/M2 | TEMPERATURE: 97.3 F | SYSTOLIC BLOOD PRESSURE: 121 MMHG

## 2021-04-13 PROCEDURE — 99214 OFFICE O/P EST MOD 30 MIN: CPT | Mod: 25

## 2021-04-13 PROCEDURE — 94010 BREATHING CAPACITY TEST: CPT

## 2021-04-13 PROCEDURE — 71046 X-RAY EXAM CHEST 2 VIEWS: CPT

## 2021-04-15 NOTE — HISTORY OF PRESENT ILLNESS
[TextBox_4] : patient with hypereosinophilic syndrome\par \par looks wonderful,  feels well,\par \par had surgery on hiatoal hernia\par \par and is on fasenra which has worked amazingly\par \par doing great in general\par \par using flovent and rescue inhaler rarely

## 2021-04-15 NOTE — DISCUSSION/SUMMARY
[FreeTextEntry1] : this is an amazing response to fasenra which was given for hypereosiniphilic syndreom\par \par fu in six months\par \par continue with present inhaler regimen

## 2021-05-02 LAB — SARS-COV-2 N GENE NPH QL NAA+PROBE: NOT DETECTED

## 2021-05-27 ENCOUNTER — OUTPATIENT (OUTPATIENT)
Dept: OUTPATIENT SERVICES | Facility: HOSPITAL | Age: 81
LOS: 1 days | End: 2021-05-27
Payer: MEDICARE

## 2021-05-27 ENCOUNTER — APPOINTMENT (OUTPATIENT)
Dept: RADIOLOGY | Facility: HOSPITAL | Age: 81
End: 2021-05-27
Payer: MEDICARE

## 2021-05-27 DIAGNOSIS — Z95.1 PRESENCE OF AORTOCORONARY BYPASS GRAFT: Chronic | ICD-10-CM

## 2021-05-27 DIAGNOSIS — Z98.890 OTHER SPECIFIED POSTPROCEDURAL STATES: Chronic | ICD-10-CM

## 2021-05-27 PROCEDURE — 74220 X-RAY XM ESOPHAGUS 1CNTRST: CPT | Mod: 26

## 2021-05-27 PROCEDURE — 74220 X-RAY XM ESOPHAGUS 1CNTRST: CPT

## 2021-06-02 ENCOUNTER — OUTPATIENT (OUTPATIENT)
Dept: OUTPATIENT SERVICES | Facility: HOSPITAL | Age: 81
LOS: 1 days | End: 2021-06-02
Payer: MEDICARE

## 2021-06-02 ENCOUNTER — APPOINTMENT (OUTPATIENT)
Age: 81
End: 2021-06-02

## 2021-06-02 VITALS
RESPIRATION RATE: 18 BRPM | TEMPERATURE: 98 F | DIASTOLIC BLOOD PRESSURE: 74 MMHG | HEART RATE: 74 BPM | SYSTOLIC BLOOD PRESSURE: 114 MMHG | OXYGEN SATURATION: 96 %

## 2021-06-02 DIAGNOSIS — Z95.1 PRESENCE OF AORTOCORONARY BYPASS GRAFT: Chronic | ICD-10-CM

## 2021-06-02 DIAGNOSIS — J45.50 SEVERE PERSISTENT ASTHMA, UNCOMPLICATED: ICD-10-CM

## 2021-06-02 DIAGNOSIS — Z98.890 OTHER SPECIFIED POSTPROCEDURAL STATES: Chronic | ICD-10-CM

## 2021-06-02 PROCEDURE — 96372 THER/PROPH/DIAG INJ SC/IM: CPT

## 2021-06-02 RX ADMIN — BENRALIZUMAB 30 MILLIGRAM(S): 30 INJECTION, SOLUTION SUBCUTANEOUS at 14:19

## 2021-06-04 ENCOUNTER — NON-APPOINTMENT (OUTPATIENT)
Age: 81
End: 2021-06-04

## 2021-06-08 ENCOUNTER — EMERGENCY (EMERGENCY)
Facility: HOSPITAL | Age: 81
LOS: 1 days | Discharge: HOME | End: 2021-06-08
Attending: EMERGENCY MEDICINE
Payer: MEDICARE

## 2021-06-08 VITALS
WEIGHT: 139.99 LBS | OXYGEN SATURATION: 98 % | HEART RATE: 88 BPM | RESPIRATION RATE: 18 BRPM | SYSTOLIC BLOOD PRESSURE: 133 MMHG | TEMPERATURE: 98 F | HEIGHT: 59 IN | DIASTOLIC BLOOD PRESSURE: 81 MMHG

## 2021-06-08 DIAGNOSIS — Z95.1 PRESENCE OF AORTOCORONARY BYPASS GRAFT: Chronic | ICD-10-CM

## 2021-06-08 DIAGNOSIS — Z98.890 OTHER SPECIFIED POSTPROCEDURAL STATES: Chronic | ICD-10-CM

## 2021-06-08 PROCEDURE — 99284 EMERGENCY DEPT VISIT MOD MDM: CPT | Mod: 25

## 2021-06-08 PROCEDURE — 99284 EMERGENCY DEPT VISIT MOD MDM: CPT

## 2021-06-08 PROCEDURE — 72125 CT NECK SPINE W/O DYE: CPT | Mod: 26,MA

## 2021-06-08 PROCEDURE — 70450 CT HEAD/BRAIN W/O DYE: CPT

## 2021-06-08 PROCEDURE — 70450 CT HEAD/BRAIN W/O DYE: CPT | Mod: 26,MA

## 2021-06-08 PROCEDURE — 72125 CT NECK SPINE W/O DYE: CPT

## 2021-06-08 NOTE — ED PROVIDER NOTE - NEUROLOGICAL POSTURING
----- Message from Shaun Breaux MD sent at 3/10/2017  8:35 AM CST -----  Yes please    ----- Message -----     From: Nasima Ro NP     Sent: 3/8/2017   6:03 PM       To: Shaun Breaux MD    He had CT today, 3 masses. I submitted for liver tx initiation. Guess I will put him on for conference?    
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 He is decompensated with ascites. [facility] and recently seen at [facility] for abd swelling and was referred to me. We made cirrhosis diagnosis at initial visit. TPCT was ordered that revealed 3 small liver lesions. AFP nl 6.9. His MELD was 16 on initial labs so he is being referred for transplant evaluation.     TPCT 3/8/17  Focal Liver Lesions:   *Lesions compatible with hepatocellular carcinoma: None     *Indeterminate liver lesions: Three as follows:   Lesion # 1 :   Lesion size: 1 cm (series 2 , image 36 ).   Lesion location: Segment 5 .   Hypervascularity: Yes   Washout: No   Capsule/pseudocapsule: No.   Change in lesion since prior exam: Not applicable. Baseline study.     Lesion # 2 :   Lesion size: 1 cm (series 2 , image 35 ).   Lesion location: Segment 5 .   Hypervascularity: Yes   Washout: Yes, only seen on delayed phase.   Capsule/pseudocapsule: No.   Change in lesion since prior exam: Not applicable. Baseline study.     Lesion # 3 :   Lesion size: 0.7 cm (series 2 , image 33 ).   Lesion location: Segment 5 .   Hypervascularity: Yes   Washout: Yes   Capsule/pseudocapsule: No.   Change in lesion since prior exam: Not applicable. Baseline study.   
Will submit for review at IR conference 4/11/17,  after 4/4 17 MRI results are available.   
normal

## 2021-06-08 NOTE — ED ADULT NURSE NOTE - OBJECTIVE STATEMENT
3 containers of salad fell on my head at the frozen section in the grocery store ,with headache, sent from urgent care for eval and ct scan pt takes aspirin everyday, no LOC, no dizziness

## 2021-06-08 NOTE — ED ADULT TRIAGE NOTE - CHIEF COMPLAINT QUOTE
3 containers of salad fell on my head at the frozen section in the grocery store ,with headache, sent from urgent care for eval and ct scan pt takes aspirin everyday 3 containers of salad fell on my head at the frozen section in the grocery store ,with headache, sent from urgent care for eval and ct scan pt takes aspirin everyday,no LOC,no dizziness

## 2021-06-08 NOTE — ED ADULT NURSE NOTE - NSIMPLEMENTINTERV_GEN_ALL_ED
Implemented All Universal Safety Interventions:  [place] to call system. Call bell, personal items and telephone within reach. Instruct patient to call for assistance. Room bathroom lighting operational. Non-slip footwear when patient is off stretcher. Physically safe environment: no spills, clutter or unnecessary equipment. Stretcher in lowest position, wheels locked, appropriate side rails in place.

## 2021-06-08 NOTE — ED ADULT NURSE NOTE - CHIEF COMPLAINT QUOTE
3 containers of salad fell on my head at the frozen section in the grocery store ,with headache, sent from urgent care for eval and ct scan pt takes aspirin everyday,no LOC,no dizziness

## 2021-06-08 NOTE — ED PROVIDER NOTE - PATIENT PORTAL LINK FT
You can access the FollowMyHealth Patient Portal offered by [facility] by registering at the following website: [email or web address]. By joining [employer]’s FollowMyHealth portal, you will also be able to view your health information using other applications (apps) compatible with our system.

## 2021-06-08 NOTE — ED PROVIDER NOTE - NSFOLLOWUPINSTRUCTIONS_ED_ALL_ED_FT
Log Out.      [employer] CareNotes®     :  [facility]  	                       HEAD INJURY - AfterCare(R) Instructions(ER/ED)           Head Injury    WHAT YOU NEED TO KNOW:    A head injury can include your scalp, face, skull, or brain and range from mild to severe. Effects can appear immediately after the injury or develop later. The effects may last a short time or be permanent. Healthcare providers may want to check your recovery over time. Treatment may change as you recover or develop new health problems from the head injury.    DISCHARGE INSTRUCTIONS:    Call your local emergency number (911 in the [place]), or have someone else call if:   •You cannot be woken.      •You have a seizure.      •You stop responding to others or you faint.      •You have blurry or double vision.      •Your speech becomes slurred or confused.      •You have arm or leg weakness, loss of feeling, or new problems with coordination.      •Your pupils are larger than usual, or one pupil is a different size than the other.      •You have blood or clear fluid coming out of your ears or nose.      Return to the emergency department if:   •You have repeated or forceful vomiting.      •You feel confused.      •Your headache gets worse or becomes severe.      •You or someone caring for you notices that you are harder to wake than usual.      Call your doctor if:   •Your symptoms last longer than 6 weeks after the injury.      •You have questions or concerns about your condition or care.      Medicines:   •Acetaminophen decreases pain and fever. It is available without a doctor's order. Ask how much to take and how often to take it. Follow directions. Read the labels of all other medicines you are using to see if they also contain acetaminophen, or ask your doctor or pharmacist. Acetaminophen can cause liver damage if not taken correctly. Do not use more than 4 grams (4,000 milligrams) total of acetaminophen in one day.       •Take your medicine as directed. Contact your healthcare provider if you think your medicine is not helping or if you have side effects. Tell him or her if you are allergic to any medicine. Keep a list of the medicines, vitamins, and herbs you take. Include the amounts, and when and why you take them. Bring the list or the pill bottles to follow-up visits. Carry your medicine list with you in case of an emergency.      Self-care:   •Rest or do quiet activities. Limit your time watching TV, using the computer, or doing tasks that require a lot of thinking. Slowly return to your normal activities as directed. Do not play sports or do activities that may cause you to get hit in the head. Ask your healthcare provider when you can return to sports.      •Apply ice on your head for 15 to 20 minutes every hour or as directed. Use an ice pack, or put crushed ice in a plastic bag. Cover it with a towel before you apply it to your skin. Ice helps prevent tissue damage and decreases swelling and pain.      •Have someone stay with you for 24 hours , or as directed. This person can monitor you for problems and call for help if needed. When you are awake, the person should ask you a few questions every few hours to see if you are thinking clearly. An example is to ask your name or address.      Prevent another head injury:   •Wear a helmet that fits properly. Do this when you play sports, or ride a bike, scooter, or skateboard. Helmets help decrease your risk for a serious head injury. Talk to your healthcare provider about other ways you can protect yourself if you play sports.      •Wear your seatbelt every time you are in a car. This helps lower your risk for a head injury if you are in a car accident.      Follow up with your doctor as directed: Write down your questions so you remember to ask them during your visits.       © Copyright [employer] 2021           back to top                          © Copyright [employer] 2021

## 2021-06-08 NOTE — ED PROVIDER NOTE - MUSCULOSKELETAL, MLM
Spine appears normal, range of motion is not limited, no muscle or joint tenderness. No spinal tenderness. No signs of trauma.

## 2021-06-08 NOTE — ED PROVIDER NOTE - OBJECTIVE STATEMENT
81 y/o female h/o CAD s/p CABG, asthma, HTN, HLD, presents after a minor head injury just prior to arrival. States heavy object fell on her head. Currently c/o mild pain to top of head. Denies LOC, dizziness, N/V, weakness, numbness, or neck pain. States she takes ASA but no other A/C. She initially presented to urgent care but they sent her here for a CT.

## 2021-06-08 NOTE — ED PROVIDER NOTE - PMH
Angina pectoris    Anxiety    Asthma with COPD    HTN (hypertension)    Hypercholesteremia    Hypereosinophilic syndrome

## 2021-06-09 VITALS
RESPIRATION RATE: 18 BRPM | SYSTOLIC BLOOD PRESSURE: 104 MMHG | OXYGEN SATURATION: 97 % | TEMPERATURE: 98 F | DIASTOLIC BLOOD PRESSURE: 74 MMHG | HEART RATE: 81 BPM

## 2021-07-28 ENCOUNTER — APPOINTMENT (OUTPATIENT)
Age: 81
End: 2021-07-28

## 2021-08-04 ENCOUNTER — OUTPATIENT (OUTPATIENT)
Dept: OUTPATIENT SERVICES | Facility: HOSPITAL | Age: 81
LOS: 1 days | End: 2021-08-04
Payer: MEDICARE

## 2021-08-04 ENCOUNTER — APPOINTMENT (OUTPATIENT)
Age: 81
End: 2021-08-04

## 2021-08-04 VITALS
DIASTOLIC BLOOD PRESSURE: 73 MMHG | SYSTOLIC BLOOD PRESSURE: 107 MMHG | OXYGEN SATURATION: 96 % | RESPIRATION RATE: 18 BRPM | TEMPERATURE: 99 F | HEART RATE: 68 BPM

## 2021-08-04 DIAGNOSIS — Z98.890 OTHER SPECIFIED POSTPROCEDURAL STATES: Chronic | ICD-10-CM

## 2021-08-04 DIAGNOSIS — Z95.1 PRESENCE OF AORTOCORONARY BYPASS GRAFT: Chronic | ICD-10-CM

## 2021-08-04 DIAGNOSIS — J45.50 SEVERE PERSISTENT ASTHMA, UNCOMPLICATED: ICD-10-CM

## 2021-08-04 PROCEDURE — 96372 THER/PROPH/DIAG INJ SC/IM: CPT

## 2021-08-04 RX ORDER — BENRALIZUMAB 30 MG/ML
30 INJECTION, SOLUTION SUBCUTANEOUS ONCE
Refills: 0 | Status: COMPLETED | OUTPATIENT
Start: 2021-08-04 | End: 2021-08-04

## 2021-08-04 RX ADMIN — BENRALIZUMAB 30 MILLIGRAM(S): 30 INJECTION, SOLUTION SUBCUTANEOUS at 14:30

## 2021-10-01 ENCOUNTER — APPOINTMENT (OUTPATIENT)
Age: 81
End: 2021-10-01

## 2021-10-01 ENCOUNTER — OUTPATIENT (OUTPATIENT)
Dept: OUTPATIENT SERVICES | Facility: HOSPITAL | Age: 81
LOS: 1 days | End: 2021-10-01
Payer: MEDICARE

## 2021-10-01 VITALS
RESPIRATION RATE: 18 BRPM | OXYGEN SATURATION: 99 % | DIASTOLIC BLOOD PRESSURE: 66 MMHG | TEMPERATURE: 100 F | SYSTOLIC BLOOD PRESSURE: 129 MMHG | HEART RATE: 77 BPM

## 2021-10-01 DIAGNOSIS — J45.50 SEVERE PERSISTENT ASTHMA, UNCOMPLICATED: ICD-10-CM

## 2021-10-01 DIAGNOSIS — Z95.1 PRESENCE OF AORTOCORONARY BYPASS GRAFT: Chronic | ICD-10-CM

## 2021-10-01 DIAGNOSIS — Z98.890 OTHER SPECIFIED POSTPROCEDURAL STATES: Chronic | ICD-10-CM

## 2021-10-01 PROCEDURE — 96372 THER/PROPH/DIAG INJ SC/IM: CPT

## 2021-10-01 RX ORDER — BENRALIZUMAB 30 MG/ML
30 INJECTION, SOLUTION SUBCUTANEOUS ONCE
Refills: 0 | Status: COMPLETED | OUTPATIENT
Start: 2021-10-01 | End: 2021-10-01

## 2021-10-01 RX ADMIN — BENRALIZUMAB 30 MILLIGRAM(S): 30 INJECTION, SOLUTION SUBCUTANEOUS at 15:14

## 2021-10-21 ENCOUNTER — LABORATORY RESULT (OUTPATIENT)
Age: 81
End: 2021-10-21

## 2021-10-25 ENCOUNTER — APPOINTMENT (OUTPATIENT)
Dept: PULMONOLOGY | Facility: CLINIC | Age: 81
End: 2021-10-25
Payer: MEDICARE

## 2021-10-25 VITALS
HEIGHT: 59 IN | SYSTOLIC BLOOD PRESSURE: 110 MMHG | DIASTOLIC BLOOD PRESSURE: 70 MMHG | RESPIRATION RATE: 15 BRPM | WEIGHT: 135 LBS | HEART RATE: 83 BPM | TEMPERATURE: 96.7 F | BODY MASS INDEX: 27.21 KG/M2 | OXYGEN SATURATION: 98 %

## 2021-10-25 PROCEDURE — 99214 OFFICE O/P EST MOD 30 MIN: CPT | Mod: 25

## 2021-10-25 PROCEDURE — 94010 BREATHING CAPACITY TEST: CPT

## 2021-10-26 NOTE — HISTORY OF PRESENT ILLNESS
[TextBox_4] : The patient is a 80 yr old female with hypereosinophilic syndrome and asthma. She has been using fasenra every other month and reports to be doing great with no side effects. No fever/ dyspnea/ sputum/ weight loss. \par using flovent and rescue inhaler rarely

## 2021-10-26 NOTE — DISCUSSION/SUMMARY
[FreeTextEntry1] : 80 yr old woman with hypereosiniphilic syndrome and asthma. \par She is on fasenra bi-monthly for hypereosinophilic syndrome and is doing well. We showed her technique for self injection of fasenra and will discuss with authorizing company if she can be educated for these and then use self injecting fasenra at home. \par \par continue with present inhaler regimen

## 2021-12-02 RX ORDER — BENRALIZUMAB 30 MG/ML
30 INJECTION, SOLUTION SUBCUTANEOUS ONCE
Refills: 0 | Status: COMPLETED | OUTPATIENT
Start: 2021-12-03 | End: 2021-12-03

## 2021-12-03 ENCOUNTER — APPOINTMENT (OUTPATIENT)
Age: 81
End: 2021-12-03

## 2021-12-03 ENCOUNTER — OUTPATIENT (OUTPATIENT)
Dept: OUTPATIENT SERVICES | Facility: HOSPITAL | Age: 81
LOS: 1 days | End: 2021-12-03
Payer: MEDICARE

## 2021-12-03 DIAGNOSIS — Z95.1 PRESENCE OF AORTOCORONARY BYPASS GRAFT: Chronic | ICD-10-CM

## 2021-12-03 DIAGNOSIS — J45.50 SEVERE PERSISTENT ASTHMA, UNCOMPLICATED: ICD-10-CM

## 2021-12-03 DIAGNOSIS — Z98.890 OTHER SPECIFIED POSTPROCEDURAL STATES: Chronic | ICD-10-CM

## 2021-12-03 PROCEDURE — 96372 THER/PROPH/DIAG INJ SC/IM: CPT

## 2021-12-03 RX ADMIN — BENRALIZUMAB 30 MILLIGRAM(S): 30 INJECTION, SOLUTION SUBCUTANEOUS at 14:00

## 2021-12-09 ENCOUNTER — APPOINTMENT (OUTPATIENT)
Dept: ORTHOPEDIC SURGERY | Facility: CLINIC | Age: 81
End: 2021-12-09
Payer: MEDICARE

## 2021-12-09 PROCEDURE — 20611 DRAIN/INJ JOINT/BURSA W/US: CPT | Mod: RT

## 2021-12-09 NOTE — PROCEDURE
[de-identified] : Ultrasound-Guided RIGHT Knee Injection\par \par Indication for U/S Guidance: Ensure placement within the tibiofemoral joint for diagnostic purposes, while avoiding neurovascular structures\par \par Indication for Injection: KNEE OSTEOARTHRITIS\par \par A discussion was had with the patient regarding this procedure and all questions were answered. All risks, benefits and alternatives were discussed. These included but were not limited to bleeding, infection, injection site reaction/complication and allergic reaction. A timeout was done to ensure correct side and pt agreed to the procedure. Betadine was used to sterilize and prep the area, and alcohol was used to clean the skin in the anterior aspect of the knee joint. The suprapatellar space was visualized utilizing the Sonosite, linear transducer. The joint was visualized in the short axis and an in-plane approach was used for the injection. Ultrasound guidance was utilized to ensure accuracy of the intra-articular injection and avoid the neurovascular structures. A 22-gauge 1.5" needle was used to inject 2cc of 1% lidocaine without epi into the SubQ.  This was followed by injection with 4CC OF MONOVISC.  A sterile bandage was then applied. The patient tolerated the procedure well and there were no complications.\par \par LOT: [id number]\par EXP: 2023-07-31\par

## 2021-12-09 NOTE — PROCEDURE
[de-identified] : Ultrasound-Guided RIGHT Knee Injection\par \par Indication for U/S Guidance: Ensure placement within the tibiofemoral joint for diagnostic purposes, while avoiding neurovascular structures\par \par Indication for Injection: KNEE OSTEOARTHRITIS\par \par A discussion was had with the patient regarding this procedure and all questions were answered. All risks, benefits and alternatives were discussed. These included but were not limited to bleeding, infection, injection site reaction/complication and allergic reaction. A timeout was done to ensure correct side and pt agreed to the procedure. Betadine was used to sterilize and prep the area, and alcohol was used to clean the skin in the anterior aspect of the knee joint. The suprapatellar space was visualized utilizing the Sonosite, linear transducer. The joint was visualized in the short axis and an in-plane approach was used for the injection. Ultrasound guidance was utilized to ensure accuracy of the intra-articular injection and avoid the neurovascular structures. A 22-gauge 1.5" needle was used to inject 2cc of 1% lidocaine without epi into the SubQ.  This was followed by injection with 4CC OF MONOVISC.  A sterile bandage was then applied. The patient tolerated the procedure well and there were no complications.\par \par LOT: [id number]\par EXP: 2023-07-31\par

## 2022-01-28 ENCOUNTER — APPOINTMENT (OUTPATIENT)
Age: 82
End: 2022-01-28

## 2022-03-25 ENCOUNTER — APPOINTMENT (OUTPATIENT)
Age: 82
End: 2022-03-25

## 2022-04-25 ENCOUNTER — APPOINTMENT (OUTPATIENT)
Dept: PULMONOLOGY | Facility: CLINIC | Age: 82
End: 2022-04-25
Payer: MEDICARE

## 2022-04-25 VITALS
DIASTOLIC BLOOD PRESSURE: 74 MMHG | OXYGEN SATURATION: 96 % | HEART RATE: 83 BPM | SYSTOLIC BLOOD PRESSURE: 126 MMHG | TEMPERATURE: 97.1 F | HEIGHT: 59 IN

## 2022-04-25 PROCEDURE — 99214 OFFICE O/P EST MOD 30 MIN: CPT | Mod: 25

## 2022-04-25 PROCEDURE — 94010 BREATHING CAPACITY TEST: CPT

## 2022-04-25 RX ORDER — OLODATEROL RESPIMAT INHALATION SPRAY 2.5 UG/1
2.5 SPRAY, METERED RESPIRATORY (INHALATION)
Qty: 1 | Refills: 11 | Status: DISCONTINUED | COMMUNITY
Start: 2020-10-12 | End: 2022-04-25

## 2022-04-26 NOTE — DISCUSSION/SUMMARY
[FreeTextEntry1] : the fasenra has wonderfully controlled her dyspnea and symptoms\par \par continue on present drugs\par \par refilled her flovent\par \par uses this with rescue inhaler\par \par and this has rendered her aysmptomatic

## 2022-05-24 ENCOUNTER — APPOINTMENT (OUTPATIENT)
Dept: HEART AND VASCULAR | Facility: CLINIC | Age: 82
End: 2022-05-24
Payer: MEDICARE

## 2022-05-24 VITALS
WEIGHT: 147 LBS | SYSTOLIC BLOOD PRESSURE: 157 MMHG | DIASTOLIC BLOOD PRESSURE: 79 MMHG | HEART RATE: 82 BPM | HEIGHT: 59 IN | OXYGEN SATURATION: 95 % | TEMPERATURE: 97.2 F | BODY MASS INDEX: 29.64 KG/M2

## 2022-05-24 PROCEDURE — 99203 OFFICE O/P NEW LOW 30 MIN: CPT | Mod: 25

## 2022-05-24 PROCEDURE — 93000 ELECTROCARDIOGRAM COMPLETE: CPT

## 2022-05-24 NOTE — ASSESSMENT
[FreeTextEntry1] : Bonnie Leal is a 80 yo woman with CAD s/p CABG 1999, HTN, high chol who is here as a new patient for me.\par \par She had been seeing Dr. Quigley in the past.\par \par Pt had echo 6/21 showing normal EF and diastolic dysfunction.\par \par Pt can only walk a few blocks and then needs to stop bc of mild SOB. No cp\par \par She has had nuclear stress test q 2 years and in 2020, there was some mild abnormality.\par \par Her BP was elevated today but she claims it is never elevated (which is consistent with the chart). will continue to monitor.\par \par Her most recent LDL = 71\par \par Continue current medications\par \par Repeat nuclear stress test.\par \par

## 2022-05-24 NOTE — HISTORY OF PRESENT ILLNESS
[FreeTextEntry1] : Bonnie Leal is a 80 yo woman with CAD s/p CABG 1999, HTN, high chol who is here as a new patient for me.\par \par She had been seeing Dr. Quigley in the past.\par \par Pt had echo 6/21 showing normal EF and diastolic dysfunction.\par \par Pt can only walk a few blocks and then needs to stop bc of mild SOB. No cp\par \par She has had nuclear stress test q 2 years and in 2020, there was some mild abnormality.\par \par

## 2022-06-04 ENCOUNTER — NON-APPOINTMENT (OUTPATIENT)
Age: 82
End: 2022-06-04

## 2022-06-09 ENCOUNTER — RESULT REVIEW (OUTPATIENT)
Age: 82
End: 2022-06-09

## 2022-06-09 ENCOUNTER — OUTPATIENT (OUTPATIENT)
Dept: OUTPATIENT SERVICES | Facility: HOSPITAL | Age: 82
LOS: 1 days | End: 2022-06-09
Payer: MEDICARE

## 2022-06-09 ENCOUNTER — APPOINTMENT (OUTPATIENT)
Dept: ORTHOPEDIC SURGERY | Facility: CLINIC | Age: 82
End: 2022-06-09
Payer: MEDICARE

## 2022-06-09 DIAGNOSIS — Z95.1 PRESENCE OF AORTOCORONARY BYPASS GRAFT: Chronic | ICD-10-CM

## 2022-06-09 DIAGNOSIS — Z98.890 OTHER SPECIFIED POSTPROCEDURAL STATES: Chronic | ICD-10-CM

## 2022-06-09 DIAGNOSIS — M17.11 UNILATERAL PRIMARY OSTEOARTHRITIS, RIGHT KNEE: ICD-10-CM

## 2022-06-09 PROCEDURE — 99214 OFFICE O/P EST MOD 30 MIN: CPT | Mod: 25

## 2022-06-09 PROCEDURE — 73564 X-RAY EXAM KNEE 4 OR MORE: CPT

## 2022-06-09 PROCEDURE — 20610 DRAIN/INJ JOINT/BURSA W/O US: CPT | Mod: RT

## 2022-06-09 PROCEDURE — 73564 X-RAY EXAM KNEE 4 OR MORE: CPT | Mod: 26,50

## 2022-06-09 RX ORDER — FLUTICASONE PROPIONATE 110 UG/1
110 AEROSOL, METERED RESPIRATORY (INHALATION)
Qty: 24 | Refills: 11 | Status: COMPLETED | COMMUNITY
Start: 2017-04-14 | End: 2022-06-09

## 2022-06-10 PROBLEM — M17.11 PRIMARY OSTEOARTHRITIS OF RIGHT KNEE: Status: ACTIVE | Noted: 2021-12-09

## 2022-06-10 RX ORDER — AZITHROMYCIN 250 MG/1
250 TABLET, FILM COATED ORAL
Qty: 6 | Refills: 0 | Status: COMPLETED | COMMUNITY
Start: 2021-12-28

## 2022-06-10 NOTE — HISTORY OF PRESENT ILLNESS
[de-identified] : Bonnie returns today for evalaution of her right knee. She has a h, o PF DJD and has managed well with HA injections. She reports a recent increase in anteiror knee pain and stiffness. She has pain with stairs and difficulty walking. She denies any locking or buckling

## 2022-06-10 NOTE — DISCUSSION/SUMMARY
[de-identified] : Bonnie has had persistent knee pain and stiffness. She received a Monovisc injection today. She will slowly increase her activities as tolerated. We will see her back on an as needed basis. She will call if any issues arise.

## 2022-06-10 NOTE — PROCEDURE
[de-identified] : Under strict sterile technique, the right  knee was prepped with Betadine. Using the superolateral approach, with the patient supine, a 4mL injection of Monovisc was administered intra-articularly. The patient tolerated the procedure well. The patient was instructed to avoid vigorous exercise for 48 hours and will apply ice to the knee for 20 minutes 2-3 times per day if discomfort occurs. Patient will return on an as needed basis. The patient will call if any questions or problems should arise.\par \par MonoVisc injection - Right knee joint\par Lot #: [id number]\par Exp: 04-\par Man: [employer]\par NDC: [address]-57

## 2022-06-10 NOTE — PHYSICAL EXAM
[ALL] : dorsalis pedis, posterior tibial, femoral, popliteal, and radial 2+ and symmetric bilaterally [Normal] : Oriented to person, place, and time, insight and judgement were intact and the affect was normal [de-identified] : The patient is a well developed, well nourished female in no apparent distress. She is alert and oriented X 3 with a pleasant mood and appropriate affect. \par \par On physical examination of the right knee, there is full range of motion. The patient walks with a slight limp  and stands in neutral alignment. There is trace effusion. No warmth or erythema is noted. The patella is tender to palpation medially and laterally. There is patellofemoral crepitus noted. The apprehension and grind tests are negative. The extensor mechanism is intact. There is no joint line tenderness. The Carin sign is absent. The Lachman and pivot shift tests are negative. There is no varus or valgus laxity at 0 or 30 degrees. No posterolateral or anteromedial laxity is noted. No masses are palpable. No other soft tissue or bony tenderness is noted. There is some tenderness noted on palpation of the IT band. Quadriceps weakness is noted. Neurovascular function is intact.   [de-identified] : Radiographs of both knees performed today show mild PF DJD in her right knee

## 2022-07-21 ENCOUNTER — FORM ENCOUNTER (OUTPATIENT)
Age: 82
End: 2022-07-21

## 2022-07-22 ENCOUNTER — RESULT REVIEW (OUTPATIENT)
Age: 82
End: 2022-07-22

## 2022-07-22 ENCOUNTER — OUTPATIENT (OUTPATIENT)
Dept: OUTPATIENT SERVICES | Facility: HOSPITAL | Age: 82
LOS: 1 days | End: 2022-07-22
Payer: MEDICARE

## 2022-07-22 DIAGNOSIS — I25.810 ATHEROSCLEROSIS OF CORONARY ARTERY BYPASS GRAFT(S) WITHOUT ANGINA PECTORIS: ICD-10-CM

## 2022-07-22 DIAGNOSIS — Z98.890 OTHER SPECIFIED POSTPROCEDURAL STATES: Chronic | ICD-10-CM

## 2022-07-22 DIAGNOSIS — Z95.1 PRESENCE OF AORTOCORONARY BYPASS GRAFT: Chronic | ICD-10-CM

## 2022-07-22 PROCEDURE — 78452 HT MUSCLE IMAGE SPECT MULT: CPT

## 2022-07-22 PROCEDURE — 93017 CV STRESS TEST TRACING ONLY: CPT

## 2022-07-22 PROCEDURE — A9500: CPT

## 2022-07-28 ENCOUNTER — RESULT REVIEW (OUTPATIENT)
Age: 82
End: 2022-07-28

## 2022-08-03 ENCOUNTER — APPOINTMENT (OUTPATIENT)
Dept: CT IMAGING | Facility: HOSPITAL | Age: 82
End: 2022-08-03

## 2022-08-03 ENCOUNTER — OUTPATIENT (OUTPATIENT)
Dept: OUTPATIENT SERVICES | Facility: HOSPITAL | Age: 82
LOS: 1 days | End: 2022-08-03
Payer: MEDICARE

## 2022-08-03 DIAGNOSIS — Z98.890 OTHER SPECIFIED POSTPROCEDURAL STATES: Chronic | ICD-10-CM

## 2022-08-03 DIAGNOSIS — Z95.1 PRESENCE OF AORTOCORONARY BYPASS GRAFT: Chronic | ICD-10-CM

## 2022-08-03 LAB — POCT ISTAT CREATININE: 0.6 MG/DL — SIGNIFICANT CHANGE UP (ref 0.5–1.3)

## 2022-08-03 PROCEDURE — 71260 CT THORAX DX C+: CPT | Mod: 26,MH

## 2022-08-03 PROCEDURE — 71260 CT THORAX DX C+: CPT

## 2022-08-03 PROCEDURE — 82565 ASSAY OF CREATININE: CPT

## 2022-08-09 ENCOUNTER — NON-APPOINTMENT (OUTPATIENT)
Age: 82
End: 2022-08-09

## 2022-09-05 NOTE — PROGRESS NOTE ADULT - PROVIDER SPECIALTY LIST ADULT
Thoracic Surgery
Heterosexual

## 2022-09-09 ENCOUNTER — APPOINTMENT (OUTPATIENT)
Dept: RADIOLOGY | Facility: HOSPITAL | Age: 82
End: 2022-09-09

## 2022-09-15 NOTE — BRIEF OPERATIVE NOTE - NSICDXBRIEFOPLAUNCH_GEN_ALL_CORE
Physical Therapy Visit    Referred by: Josh Valero MD; Medical Diagnosis (from order):    Diagnosis Information      Diagnosis    V45.89 (ICD-9-CM) - Z98.890 (ICD-10-CM) - S/P left knee arthroscopy              Visit: 11    Visit Type: Daily Treatment Note  Patient alert and oriented X3.    SUBJECTIVE                                                                                                               Back to work 4 hours this week with 2 hours of sitting and 2 hours of standing.  2 hours of standing is going well.  Up to 6 hours next week with sitting as needed.  Kneeling and squatting are difficult on the knee.   Functional Change: Improved standing tolerance.    Pain / Symptoms:  Pain rating (out of 10): Current: 2     OBJECTIVE                                                                                                                        TREATMENT                                                                                                                  Therapeutic Exercise:  Treadmill walking 1.5 mph x 5 minutes -activity tolerance    Standing:  Lunge with Left knee touching airex mat on step x 10 -assist to improve kneeling tolerance  Summo squats x 15 -pressure to knee but tolerable  6\" step up with Left planted x 10 -completed at end of session and noted shaking to leg    Knee flexion stretching (sitting back on feet) while Kneeling on airex mat 15 second holds x 5        Therapeutic Activity:  Kneeling on off airex mat leading with Left and use of 1 UE assist 2 x 5    Lifting crate from floor to 3ft shelf 3 x 21#; 2 set of 3 with 26#,  Slight increase in left knee soreness.  Lift and carry crate and walk 50 feet with 21#, 2 sets of 26#  Push/pull sled with 30#, 6 x 30 feet  Pushing louise 100 feet with 32#, 42# and 52#    Neuromuscular Re-Education:  Airex mat:  Single leg stance Left x 15 seconds  Single leg stance Left with hip abduction taps x 10 Right   Single leg stance Left  with hip extension taps x 10 Right     Kinesio taping to Right knee for mechanical correction of patella.  I-strip 3 blocks long with 50% tension to inferior patella providing upward glide.  2, I-strips 4 blocks long with 10% tension to medial and lateral patella.  Pt instructed in use, precautions, care, and removal of K-tape.  Instructed to remove if notes redness, itching or irritation.     Skilled input: verbal instruction/cues, tactile instruction/cues and as detailed above    Writer verbally educated and received verbal consent for hand placement, positioning of patient, and techniques to be performed today from patient for clothing adjustments for techniques, therapist position for techniques and hand placement and palpation for techniques as described above and how they are pertinent to the patient's plan of care.    Home Exercise Program/Education Materials: Access Code: [email or web address]  URL: [email or web address]  Date: 08/10/2022  Prepared by: Uriel Ly    Exercises  · Supine Ankle Pumps - 3 x daily - 7 x weekly - 10 reps  · Supine Heel Slide - 3 x daily - 7 x weekly - 10 reps  · Supine Gluteal Sets - 3 x daily - 7 x weekly - 10 reps - 5 hold  · Supine Quad Set - 3 x daily - 7 x weekly - 10 reps - 5 hold  · Supine Short Arc Quad - 3 x daily - 7 x weekly - 10 reps  · Straight Leg Raise - 3 x daily - 7 x weekly - 10 reps  · Seated Knee Extension AROM - 3 x daily - 7 x weekly - 10 reps  · Seated Hamstring Stretch - 3 x daily - 7 x weekly - 3 reps - 20-30 hold       Access Code: [email or web address]  URL: [email or web address]  Date: 08/24/2022  Prepared by: Uriel Ly    Exercises  · Supine Heel Slide - 3 x daily - 7 x weekly - 10 reps  · Supine Quad Set - 3 x daily - 7 x weekly - 10 reps - 5 hold  · Straight Leg Raise - 3 x daily - 7 x weekly - 10 reps  · Seated Hamstring Stretch - 3 x daily - 7 x weekly - 3 reps - 20-30 hold  · Sit to Stand with Arms Crossed - 3 x daily - 7  x weekly - 10 reps  · Standing March with Counter Support - 3 x daily - 7 x weekly - 10 reps  · Side Stepping with Counter Support - 3 x daily - 7 x weekly - 10 reps  · Mini Squat with Counter Support - 3 x daily - 7 x weekly - 10 reps     8/26/22: provided red theraband.                  ASSESSMENT                                                                                                             Transitioned from bike to treadmill to improve walking tolerance. Increased weight with lifting and carrying by 5#; reported some soreness but tolerable.  Complete kneeling and knee flexion stretching to improve tolerance with functional tasks.  Able to push luoise with 50# without difficulty.  Reported tension to the Left knee with lifting and deep squats.  Reapplied kinesio tape for mechanical correction to patella.  Recommended icing knee as needed.   Pain/symptoms after session (out of 10): 5  Patient Education:   Results of above outlined education: Verbalizes understanding and Needs reinforcement      PLAN                                                                                                                           Suggestions for next session as indicated: Progress per plan of care increase weight and reps/sets with lifting and work related activities as appropriate.  Quad and hip strengthening, progressing single leg balance and stability.         Therapy procedure time and total treatment time can be found documented on the Time Entry flowsheet   <--- Click to Launch ICDx for PreOp, PostOp and Procedure

## 2022-09-21 ENCOUNTER — OUTPATIENT (OUTPATIENT)
Dept: OUTPATIENT SERVICES | Facility: HOSPITAL | Age: 82
LOS: 1 days | End: 2022-09-21
Payer: MEDICARE

## 2022-09-21 ENCOUNTER — APPOINTMENT (OUTPATIENT)
Dept: RADIOLOGY | Facility: HOSPITAL | Age: 82
End: 2022-09-21

## 2022-09-21 DIAGNOSIS — Z95.1 PRESENCE OF AORTOCORONARY BYPASS GRAFT: Chronic | ICD-10-CM

## 2022-09-21 DIAGNOSIS — Z98.890 OTHER SPECIFIED POSTPROCEDURAL STATES: Chronic | ICD-10-CM

## 2022-09-21 PROCEDURE — 74220 X-RAY XM ESOPHAGUS 1CNTRST: CPT

## 2022-09-21 PROCEDURE — 74220 X-RAY XM ESOPHAGUS 1CNTRST: CPT | Mod: 26

## 2022-10-17 ENCOUNTER — APPOINTMENT (OUTPATIENT)
Dept: PULMONOLOGY | Facility: CLINIC | Age: 82
End: 2022-10-17

## 2022-10-24 ENCOUNTER — APPOINTMENT (OUTPATIENT)
Dept: PULMONOLOGY | Facility: CLINIC | Age: 82
End: 2022-10-24

## 2022-10-24 VITALS
WEIGHT: 147 LBS | HEART RATE: 91 BPM | OXYGEN SATURATION: 95 % | SYSTOLIC BLOOD PRESSURE: 148 MMHG | BODY MASS INDEX: 29.64 KG/M2 | HEIGHT: 59 IN | DIASTOLIC BLOOD PRESSURE: 78 MMHG | TEMPERATURE: 97.2 F

## 2022-10-24 PROCEDURE — 99214 OFFICE O/P EST MOD 30 MIN: CPT | Mod: 25

## 2022-10-24 PROCEDURE — 94010 BREATHING CAPACITY TEST: CPT

## 2022-10-26 ENCOUNTER — APPOINTMENT (OUTPATIENT)
Dept: CT IMAGING | Facility: HOSPITAL | Age: 82
End: 2022-10-26

## 2022-10-26 ENCOUNTER — OUTPATIENT (OUTPATIENT)
Dept: OUTPATIENT SERVICES | Facility: HOSPITAL | Age: 82
LOS: 1 days | End: 2022-10-26
Payer: MEDICARE

## 2022-10-26 DIAGNOSIS — Z98.890 OTHER SPECIFIED POSTPROCEDURAL STATES: Chronic | ICD-10-CM

## 2022-10-26 DIAGNOSIS — Z95.1 PRESENCE OF AORTOCORONARY BYPASS GRAFT: Chronic | ICD-10-CM

## 2022-10-26 PROCEDURE — 82565 ASSAY OF CREATININE: CPT

## 2022-10-26 PROCEDURE — 74160 CT ABDOMEN W/CONTRAST: CPT

## 2022-10-26 PROCEDURE — 74160 CT ABDOMEN W/CONTRAST: CPT | Mod: 26,MH

## 2022-11-21 NOTE — HISTORY OF PRESENT ILLNESS
[TextBox_4] : She has a history of asthma and hypereosinophilia. She Is still using Flovent. Has been on Fasenra for 1 year, taking the injections at home every 8 weeks. Since starting, hasn't had any exacerbations. Reports eosinophils are still low. Saw cardiologist and underwent a nuclear stress test where she was indecently found t have a subdiaphragmatic fluid collection for which she is being evaluated.

## 2022-11-21 NOTE — ASSESSMENT
[FreeTextEntry1] : Her asthma symptoms are stable, spirometry here today is relatively unchanged from prior. Will have her continue Flovent and Fasenra, PRN albuterol. Will have her see CT surgery Dr. Burleson for consultation regarding her abdominal fluid collection in setting of prior surgical hernia repair.

## 2022-12-01 ENCOUNTER — NON-APPOINTMENT (OUTPATIENT)
Age: 82
End: 2022-12-01

## 2022-12-01 ENCOUNTER — APPOINTMENT (OUTPATIENT)
Dept: THORACIC SURGERY | Facility: CLINIC | Age: 82
End: 2022-12-01

## 2022-12-01 VITALS
RESPIRATION RATE: 17 BRPM | TEMPERATURE: 97.3 F | OXYGEN SATURATION: 97 % | DIASTOLIC BLOOD PRESSURE: 72 MMHG | BODY MASS INDEX: 28.22 KG/M2 | HEART RATE: 90 BPM | WEIGHT: 140 LBS | SYSTOLIC BLOOD PRESSURE: 141 MMHG | HEIGHT: 59 IN

## 2022-12-01 DIAGNOSIS — K44.9 DIAPHRAGMATIC HERNIA W/OUT OBSTRUCTION OR GANGRENE: ICD-10-CM

## 2022-12-01 DIAGNOSIS — R18.8 OTHER ASCITES: ICD-10-CM

## 2022-12-01 PROCEDURE — 99213 OFFICE O/P EST LOW 20 MIN: CPT

## 2022-12-02 PROBLEM — R18.8 ABDOMINAL FLUID COLLECTION: Status: ACTIVE | Noted: 2022-12-02

## 2022-12-02 NOTE — HISTORY OF PRESENT ILLNESS
[FreeTextEntry1] : Patient is an 81 yo female with a PMHx hypereosinophilic syndrome, CABG in 1999, asthma and hiatal hernia. She was referred initially by Dr. Lees. \par \par On 02/25/2020, she underwent a laparoscopic type 4 hiatal hernia repair with Dr. Ortiz. Has been stable and followed with esophagram annually. \par \par Patient saw cardiologist Dr. Shana Umanzor and underwent a nuclear stress test where she was incidentally found to have a subdiaphragmatic fluid collection. She presents today for further evaluation. She denies fever, chills, nausea, vomiting, acid reflux, or abdominal pain. \par \par CT scan of abdomen 10/26/22:\par -Since Aug 3, 2022, unchanged left subdiaphragmatic fluid collection in hiatal hernia repair surgical bed, possibly chronic post surgical change. Consider surgical consult.\par \par Xray Esophagram 09/21/22:\par - The previous described perigastric collection on the ct chest dated 8/3/22, shows no communication with the esophagus or the stomach. No contrast excavation. No fistula.\par \par

## 2022-12-02 NOTE — ASSESSMENT
[FreeTextEntry1] : 81 yo female with a PMHx  hypereosinophilic syndrome, CABG in 1999, asthma and a laparoscopic type 4 hiatal hernia repair 2/25/20.\par \par Patient is an 81 yo female with a PMHx hypereosinophilic syndrome, CABG in 1999, asthma and hiatal hernia. She was referred initially by Dr. Lees. \par \par On 02/25/2020, she underwent a laparoscopic type 4 hiatal hernia repair with Dr. Ortiz. Has been stable and followed with esophagram annually. She denies fever, chills, nausea, vomiting, or difficulty eating. \par \par Her imaging was reviewed with the patient.\par \par CT scan of abdomen 10/26/22:\par -Since Aug 3, 2022, unchanged left subdiaphragmatic fluid collection in hiatal hernia repair surgical bed, possibly chronic post surgical change. Consider surgical consult.\par \par Xr Esophagram 09/21/22:\par - The previous described perigastric collection on the CT chest dated 8/3/22, shows no communication with the esophagus or the stomach. No contrast excavation. No fistula.)\par \par She is doing well and her findings are likely postoperative changes given her lack of symptoms.\par \par We will follow her with a esophagram in 1 year.\par \par Plan:\par 1. Esophagram in 1 year\par \par SARAH BETH Abdi, was scribe [and  if needed] for and in the presence of Dr. Blaire Burleson for the following sections: history of present illness, past medical/surgical/family/social/ allergy history, review of systems, vital signs, physical exam, and disposition.\par \par Blaire AMADOR MD personally performed the services described in the documentation, reviewed the documentation recorded by the scribe in my presence and it accurately and completely records my words and actions. I have personally seen, examined, and participated in the care of this patient.  I have reviewed all pertinent clinical information, including history and physical exam and plan.  I agree with the above history, physical and plan of the ACP which I have reviewed and edited where appropriate.\par \par \par \par \par

## 2023-03-20 ENCOUNTER — APPOINTMENT (OUTPATIENT)
Dept: HEART AND VASCULAR | Facility: CLINIC | Age: 83
End: 2023-03-20
Payer: MEDICARE

## 2023-03-20 VITALS
HEART RATE: 84 BPM | DIASTOLIC BLOOD PRESSURE: 75 MMHG | TEMPERATURE: 97.1 F | BODY MASS INDEX: 28.22 KG/M2 | HEIGHT: 59 IN | WEIGHT: 140 LBS | SYSTOLIC BLOOD PRESSURE: 161 MMHG | OXYGEN SATURATION: 97 %

## 2023-03-20 VITALS — DIASTOLIC BLOOD PRESSURE: 82 MMHG | SYSTOLIC BLOOD PRESSURE: 135 MMHG

## 2023-03-20 PROCEDURE — 99215 OFFICE O/P EST HI 40 MIN: CPT

## 2023-03-20 RX ORDER — FLUTICASONE PROPIONATE 50 UG/1
50 SPRAY, METERED NASAL
Qty: 16 | Refills: 0 | Status: COMPLETED | COMMUNITY
Start: 2021-11-22 | End: 2023-03-20

## 2023-03-20 RX ORDER — PREDNISOLONE ACETATE 10 MG/ML
1 SUSPENSION/ DROPS OPHTHALMIC
Qty: 5 | Refills: 0 | Status: COMPLETED | COMMUNITY
Start: 2022-03-28 | End: 2023-03-20

## 2023-03-20 RX ORDER — ETODOLAC 400 MG/1
400 TABLET, FILM COATED ORAL
Qty: 60 | Refills: 1 | Status: COMPLETED | COMMUNITY
Start: 2021-12-22 | End: 2023-03-20

## 2023-03-20 RX ORDER — CLARITHROMYCIN 500 MG/1
500 TABLET, FILM COATED ORAL
Qty: 6 | Refills: 0 | Status: DISCONTINUED | COMMUNITY
Start: 2022-11-21

## 2023-03-20 RX ORDER — MUPIROCIN 20 MG/G
2 OINTMENT TOPICAL
Qty: 22 | Refills: 0 | Status: DISCONTINUED | COMMUNITY
Start: 2023-03-07

## 2023-03-20 RX ORDER — HYDROCORTISONE 25 MG/G
2.5 CREAM TOPICAL
Qty: 28 | Refills: 0 | Status: COMPLETED | COMMUNITY
Start: 2022-05-17 | End: 2023-03-20

## 2023-03-20 NOTE — PHYSICAL EXAM
[Well Developed] : well developed [Well Nourished] : well nourished [Normal Conjunctiva] : normal conjunctiva [No Carotid Bruit] : no carotid bruit [Normal] : clear lung fields, good air entry, no respiratory distress [Soft] : abdomen soft [Non Tender] : non-tender [No Edema] : no edema [Moves all extremities] : moves all extremities [Normal Speech] : normal speech [Alert and Oriented] : alert and oriented [Normal memory] : normal memory

## 2023-03-21 NOTE — DISCUSSION/SUMMARY
[FreeTextEntry1] : This is a 82 year old female with with CAD s/p CABG 1999, HTN, HLD who is presenting to initiate care, transfer from Dr. Umanzor.\par \par #CAD s/p CABG\par #HLD\par -current regimen: crestor 10mg every other day, zetia 10mg qd, ASA 81mg every other day\par -most recent LDL 77 (02/2023), dyslipidemia not yet controlled on current regimen, goal LDL<55\par plan:\par   -CONTINUE ASA 81mg every other day (due to hematuria), zetia 10mg qd\par   -INCREASE crestor 10mg slowly to every day. pt to start by taking it 5x per week, then 6x, then daily while monitoring for symptoms\par   -will repeat lipid panel in 3mo\par   -management of ASCVD risk factors as above\par \par #HTN\par -current regimen: irbesartan 75mg qd\par -BP elevated today in office, appears to be elevated during visits. unclear if patient has component of white coat hypertension because she reports at-home BPs consistently <130/80. \par plan:\par   -continue current regimen for now\par   -pt to record blood pressures, and will follow up to review and determine if medication requires titration\par \par return to clinic in 3mo

## 2023-03-21 NOTE — HISTORY OF PRESENT ILLNESS
[FreeTextEntry1] : This is a 82 year old female with with CAD s/p CABG 1999, HTN, HLD who is presenting to initiate care, transfer from Dr. Umanzor.\par \par Patient reports that she is currently taking ASA 81mg every other day, because taking it daily exacerbates blood in urine. Also reports that she has been taking crestor 10mg every other day because she used to have myalgias with daily dosing. she has not  had muscle aches in years.\par \par Today she denies any lightheadedness, fatigue, shortness of breath, dyspnea on exertion, chest pain or palpitations, abdominal pain or GI upset, lower extremity edema, claudication, or PND/orthopnea.\par \par CARDIOMETABOLIC RISK FACTORS:\par Family History: sister (71yo) and father passed from heart disease\par \par Lifestyle History: \par   -Smoking: NEVER\par   -EtOH: NO\par   -Ilicit drug use: NO\par   -Exercise: YES, light intensity <30 min per week. \par   -Stress: YES, anxiety \par   -Sleep apnea: NO\par   -Mediterranean Diet Score (9 question survey) was 8 (optimal)\par \par Do you have polycystic ovarian syndrome? NO\par Have you ever been pregnant? If so, how many times? NO\par Did you have any complications during pregnancy or postpartum? NO\par Have you had any miscarriages? If so, how many? NO\par Have you gone through menopause? If yes, at what age? YES, 52\par \par CARDIOMETABOLIC & DISEASE-MODIFYING RISK FACTORS: \par ===============================\par RADIOLOGY/IMAGING/DIAGNOSTIC TESTING:\par  -Nuclear Stress ECHO (07/2022): normal baseline exercise EKG, stress EKG with appropriate response to exercise and an isolated pvc. no exercise-induced arrhythmia.   fixed myocardial defects in the apex, apical inferior, mid inferior walls with normal wall motion. \par \par LABS:\par  -lipid panel (02/2023): TG 89, Tchol 147, HDL 52, LDL 77 [crestor 10mg every other day + zetia 10mg qd]\par  -lipid panel (04/2022): , Tchol 137, HDL 46, LDL 71, nonHDL 91\par  -A1c (02/2023): 5.6%\par  -A1c (04/2022): 5.5%

## 2023-04-21 ENCOUNTER — APPOINTMENT (OUTPATIENT)
Dept: PULMONOLOGY | Facility: CLINIC | Age: 83
End: 2023-04-21
Payer: MEDICARE

## 2023-04-21 VITALS
DIASTOLIC BLOOD PRESSURE: 76 MMHG | HEART RATE: 77 BPM | BODY MASS INDEX: 28.22 KG/M2 | OXYGEN SATURATION: 96 % | SYSTOLIC BLOOD PRESSURE: 132 MMHG | TEMPERATURE: 97.1 F | WEIGHT: 140 LBS | HEIGHT: 59 IN

## 2023-04-21 PROCEDURE — 94010 BREATHING CAPACITY TEST: CPT

## 2023-04-21 PROCEDURE — 99214 OFFICE O/P EST MOD 30 MIN: CPT | Mod: 25

## 2023-04-21 RX ORDER — BECLOMETHASONE DIPROPIONATE HFA 80 UG/1
80 AEROSOL, METERED RESPIRATORY (INHALATION)
Qty: 1 | Refills: 11 | Status: DISCONTINUED | COMMUNITY
Start: 2022-04-25 | End: 2023-04-21

## 2023-04-23 NOTE — REASON FOR VISIT
[Follow-Up] : a follow-up visit [TextBox_44] : 82 year old woman what asthma, cad on fasernra for hyper eosinophilic syndrome

## 2023-04-23 NOTE — HISTORY OF PRESENT ILLNESS
[TextBox_4] : doing very well on fasenra for hyper eosinophilic syndreom \par \par cad well controlled transferred care to dr. malin\par \par breathing well \par \par never uses the rescue inhsler  on  flovent\par \par reviewed cardiac meds with her no change\par  \par

## 2023-07-03 ENCOUNTER — APPOINTMENT (OUTPATIENT)
Dept: HEART AND VASCULAR | Facility: CLINIC | Age: 83
End: 2023-07-03
Payer: MEDICARE

## 2023-07-03 VITALS
OXYGEN SATURATION: 95 % | DIASTOLIC BLOOD PRESSURE: 74 MMHG | HEART RATE: 86 BPM | SYSTOLIC BLOOD PRESSURE: 165 MMHG | TEMPERATURE: 97 F | BODY MASS INDEX: 28.22 KG/M2 | HEIGHT: 59 IN | WEIGHT: 140 LBS

## 2023-07-03 VITALS — SYSTOLIC BLOOD PRESSURE: 149 MMHG | DIASTOLIC BLOOD PRESSURE: 80 MMHG

## 2023-07-03 DIAGNOSIS — I73.9 PERIPHERAL VASCULAR DISEASE, UNSPECIFIED: ICD-10-CM

## 2023-07-03 PROCEDURE — 93000 ELECTROCARDIOGRAM COMPLETE: CPT

## 2023-07-03 PROCEDURE — 99214 OFFICE O/P EST MOD 30 MIN: CPT

## 2023-07-03 RX ORDER — ROSUVASTATIN CALCIUM 10 MG/1
10 TABLET, FILM COATED ORAL
Qty: 90 | Refills: 3 | Status: DISCONTINUED | COMMUNITY
End: 2023-07-03

## 2023-07-03 NOTE — DISCUSSION/SUMMARY
[FreeTextEntry1] : This is a 82 year old female with with CAD s/p CABG 1999, HTN, HLD who is presenting for a follow up visit. \par \par #CAD s/p CABG\par #HLD\par - current regimen: crestor 10mg every other day, zetia 10mg qd, ASA 81mg every other day\par - most recent LDL 77 (02/2023), dyslipidemia not yet controlled on current regimen, goal LDL<55\par - CONTINUE ASA 81mg every other day (due to hematuria), zetia 10mg qd\par - Due to muscle cramps with rosuvastatin, WILL STOP  rosuvastatin and start fluvastatin 80 mg daily \par - Follow up lipid panel in 2.5 months, prescription given \par - Follow up in office three months \par \par #HTN\par - BP elevated today in office, appears to be elevated during visits. unclear if patient has component of white coat hypertension\par - in office staff showed patient how take accurate blood pressure at home, advised patient to keep a log of her blood pressures at home \par - Advise patient to bring back a log of blood pressure in 3 months \par - continue with irbesartan 75 mg oral daily for now, will modify further when upon evaluating blood pressure at home \par \par #Life style intervention\par - advised patient to be more active daily\par - advised patient on 60 minutes of walking a day \par - advised to continue with the healthy diet of vegetables and protein \par \par return to clinic in 3mo

## 2023-07-03 NOTE — HISTORY OF PRESENT ILLNESS
[FreeTextEntry1] : This is a 82 year old female with with CAD s/p CABG 1999, HTN, HLD who is presenting for a follow up visit. \par \par Patient reports that she is currently taking ASA 81mg every other day, because taking it daily exacerbates blood in urine. Also reports that she has been taking rosuvastatin 10 mg every other day because of nightly muscle cramps (sometimes she takes it every third day). She complains of some shortness of breath when she climbs a flight of stairs. She also states being more stressed the last one week and has been having right sided chest pain which is worse when she moves her torso. This chest pain has resolved. \par \par For HTN, patient has not been taking her blood pressure at home regularly as she was experiencing trouble with cuff placement. Blood pressure in office noted to be elevated (163/80 mm Hg --> 160/80 mm Hg--> 150/80 mm Hg). She states that she tends to get elevated blood pressure in office. \par \par Today she denies any lightheadedness, fatigue, shortness of breath, dyspnea on exertion, chest pain or palpitations, abdominal pain or GI upset, lower extremity edema, claudication, or PND/orthopnea. States that she has limited mobility due to balance issues. No recent falls reported. Endorses eating a healthy diet of protein and vegetables. \par \par CARDIOMETABOLIC & DISEASE-MODIFYING RISK FACTORS: \par RADIOLOGY/IMAGING/DIAGNOSTIC TESTING:\par  -Nuclear Stress ECHO (07/2022): normal baseline exercise EKG, stress EKG with appropriate response to exercise and an isolated pvc. no exercise-induced arrhythmia.   fixed myocardial defects in the apex, apical inferior, mid inferior walls with normal wall motion. \par \par LABS:\par  -lipid panel (02/2023): TG 89, Tchol 147, HDL 52, LDL 77 [crestor 10mg every other day + zetia 10mg qd]\par  -lipid panel (04/2022): , Tchol 137, HDL 46, LDL 71, nonHDL 91\par  -A1c (02/2023): 5.6%\par  -A1c (04/2022): 5.5%

## 2023-07-03 NOTE — REVIEW OF SYSTEMS
[Dyspnea on exertion] : dyspnea during exertion [Fever] : no fever [Weight Gain (___ Lbs)] : no recent weight gain [Headache] : no headache [Blurry Vision] : no blurred vision [Seeing Double (Diplopia)] : no diplopia [Earache] : no earache [SOB] : no shortness of breath [Chest Discomfort] : no chest discomfort [Lower Ext Edema] : no extremity edema [Leg Claudication] : no intermittent leg claudication [Palpitations] : no palpitations [Orthopnea] : no orthopnea [PND] : no PND [Syncope] : no syncope [Cough] : no cough [Wheezing] : no wheezing [Coughing Up Blood] : no hemoptysis [Snoring] : no snoring [Abdominal Pain] : no abdominal pain [Nausea] : no nausea [Vomiting] : no vomiting [Heartburn] : no heartburn [Change in Appetite] : no change in appetite [Change In The Stool] : no change in stool [Dysphagia] : no dysphagia [Diarrhea] : diarrhea [Constipation] : no constipation [Blood in Stool] : no blood in stool [Joint Pain] : no joint pain [Joint Stiffness] : no joint stiffness [Joint Swelling] : no joint swelling [Muscle Cramps] : no muscle cramps [Myalgia] : no myalgia [Rash] : no rash [Itching] : no itching [Skin Lesions] : no skin lesions [Dizziness] : no dizziness [Telangiectasias] : no telangiectasias [Tremor] : no tremor was seen [Numbness (Hypoesthesia)] : no numbness [Convulsions] : no convulsions [Tingling (Paresthesia)] : no tingling [Weakness] : no weakness [Limb Weakness (Paresis)] : no limb weakness (Paresis) [Confusion] : no confusion was observed [Speech Disturbance] : no speech disturbance [Depression] : no depression [Anxiety] : no anxiety [FreeTextEntry5] : slight dyspnea on exertion noted  [FreeTextEntry9] : patient complains of occasional gait issues when walking, no falls reported

## 2023-08-23 ENCOUNTER — RX RENEWAL (OUTPATIENT)
Age: 83
End: 2023-08-23

## 2023-08-23 RX ORDER — FLUTICASONE PROPIONATE 44 UG/1
44 AEROSOL, METERED RESPIRATORY (INHALATION)
Qty: 3 | Refills: 3 | Status: ACTIVE | COMMUNITY
Start: 2020-11-10 | End: 1900-01-01

## 2023-09-29 ENCOUNTER — APPOINTMENT (OUTPATIENT)
Dept: HEART AND VASCULAR | Facility: CLINIC | Age: 83
End: 2023-09-29
Payer: MEDICARE

## 2023-09-29 VITALS — DIASTOLIC BLOOD PRESSURE: 72 MMHG | SYSTOLIC BLOOD PRESSURE: 137 MMHG

## 2023-09-29 DIAGNOSIS — R53.83 OTHER FATIGUE: ICD-10-CM

## 2023-09-29 PROCEDURE — 99214 OFFICE O/P EST MOD 30 MIN: CPT | Mod: 95

## 2023-10-02 RX ORDER — ALIROCUMAB 150 MG/ML
150 INJECTION, SOLUTION SUBCUTANEOUS
Qty: 6 | Refills: 3 | Status: DISCONTINUED | COMMUNITY
Start: 2023-09-29 | End: 2023-10-02

## 2023-10-02 RX ORDER — EVOLOCUMAB 140 MG/ML
140 INJECTION, SOLUTION SUBCUTANEOUS
Qty: 3 | Refills: 3 | Status: ACTIVE | COMMUNITY
Start: 2023-10-02 | End: 1900-01-01

## 2023-10-12 ENCOUNTER — TRANSCRIPTION ENCOUNTER (OUTPATIENT)
Age: 83
End: 2023-10-12

## 2023-10-16 ENCOUNTER — TRANSCRIPTION ENCOUNTER (OUTPATIENT)
Age: 83
End: 2023-10-16

## 2023-10-20 ENCOUNTER — APPOINTMENT (OUTPATIENT)
Dept: PULMONOLOGY | Facility: CLINIC | Age: 83
End: 2023-10-20
Payer: MEDICARE

## 2023-10-20 VITALS
WEIGHT: 140 LBS | HEIGHT: 59 IN | BODY MASS INDEX: 28.22 KG/M2 | DIASTOLIC BLOOD PRESSURE: 80 MMHG | OXYGEN SATURATION: 97 % | TEMPERATURE: 97.9 F | SYSTOLIC BLOOD PRESSURE: 125 MMHG | HEART RATE: 73 BPM

## 2023-10-20 PROCEDURE — 71046 X-RAY EXAM CHEST 2 VIEWS: CPT

## 2023-10-20 PROCEDURE — 94010 BREATHING CAPACITY TEST: CPT

## 2023-11-06 ENCOUNTER — NON-APPOINTMENT (OUTPATIENT)
Age: 83
End: 2023-11-06

## 2023-12-06 RX ORDER — MOMETASONE FUROATE 220 UG/1
220 INHALANT RESPIRATORY (INHALATION)
Qty: 3 | Refills: 3 | Status: ACTIVE | COMMUNITY
Start: 2023-12-06 | End: 1900-01-01

## 2024-01-11 RX ORDER — BENRALIZUMAB 30 MG/ML
30 INJECTION, SOLUTION SUBCUTANEOUS
Qty: 1 | Refills: 5 | Status: ACTIVE | COMMUNITY
Start: 1900-01-01 | End: 1900-01-01

## 2024-03-18 ENCOUNTER — APPOINTMENT (OUTPATIENT)
Dept: HEART AND VASCULAR | Facility: CLINIC | Age: 84
End: 2024-03-18
Payer: MEDICARE

## 2024-03-18 VITALS
HEART RATE: 72 BPM | DIASTOLIC BLOOD PRESSURE: 81 MMHG | HEIGHT: 59 IN | OXYGEN SATURATION: 96 % | WEIGHT: 145 LBS | SYSTOLIC BLOOD PRESSURE: 159 MMHG | BODY MASS INDEX: 29.23 KG/M2 | TEMPERATURE: 98.1 F

## 2024-03-18 VITALS — SYSTOLIC BLOOD PRESSURE: 136 MMHG | DIASTOLIC BLOOD PRESSURE: 80 MMHG

## 2024-03-18 DIAGNOSIS — R06.02 SHORTNESS OF BREATH: ICD-10-CM

## 2024-03-18 DIAGNOSIS — I25.810 ATHEROSCLEROSIS OF CORONARY ARTERY BYPASS GRAFT(S) W/OUT ANGINA PECTORIS: ICD-10-CM

## 2024-03-18 DIAGNOSIS — I10 ESSENTIAL (PRIMARY) HYPERTENSION: ICD-10-CM

## 2024-03-18 DIAGNOSIS — E78.5 HYPERLIPIDEMIA, UNSPECIFIED: ICD-10-CM

## 2024-03-18 PROCEDURE — 93000 ELECTROCARDIOGRAM COMPLETE: CPT

## 2024-03-18 PROCEDURE — 99215 OFFICE O/P EST HI 40 MIN: CPT

## 2024-03-18 NOTE — DISCUSSION/SUMMARY
[EKG obtained to assist in diagnosis and management of assessed problem(s)] : EKG obtained to assist in diagnosis and management of assessed problem(s) [FreeTextEntry1] : This is a 83 year old female with with CAD s/p CABG 1999, HTN, HLD who is presenting for a follow up visit.   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 - work to increase fiber specifically   AMBRIZ: - will order repeat echo, but unlikely to be cardiac etiology  - will refer to Dr. Butterfield for further eval as well sleep management   RTC 6 months.

## 2024-03-18 NOTE — HISTORY OF PRESENT ILLNESS
[FreeTextEntry1] : This is a 83 year old female with CAD s/p CABG 1999, HTN, HLD who is presenting for a follow up visit.   Today she denies any lightheadedness, fatigue, shortness of breath, chest pain or palpitations, abdominal pain or GI upset, lower extremity edema, claudication, or PND/orthopnea. She does report some AMBRIZ when out walking. She also has asthma and feels it could be that and deconditioning.  She reports not sleeping well and tends to snack on carbs when up at night. Otherwise, she feels she eats well: fish, fruits and veggies. She hasn't been eating many nuts or seeds lately.   She was previously c/o leg heaviness that she was wondering if related to fluvastatin. She feels using CoQ10 has helped her tolerate the statin. She also reports continued compliance w/ Zetia. 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   She had a lot of personal issues going on, including identity theft.  Home BP logs has consistently been under 120/80 since this year.  3/4/24 117/67 3/9 119/59 3/16 126/62 3/17 117/63  ===============================================================================  She has been checking her blood pressures at home and she has not noted that 70% are <130 mmHg. BP's are mostly in the 120's. Home BP on call- 137/72  She has recently noted that she has leg fatigue that she wonders whether related to fluvsastatin. She only notes this on select days. She has never had these heavy calves before and wants to be sure this is not the medicine. She is willing to try it out a little longer to see if tolerable.   CARDIOMETABOLIC & DISEASE-MODIFYING RISK FACTORS:  RADIOLOGY/IMAGING/DIAGNOSTIC TESTING:  -Nuclear Stress ECHO (07/2022): normal baseline exercise EKG, stress EKG with appropriate response to exercise and an isolated pvc. no exercise-induced arrhythmia.   fixed myocardial defects in the apex, apical inferior, mid inferior walls with normal wall motion.   LABS: -lipid panel (09/2023): , HDL 53, , LDL 93 (fluvastatin & zetia daliy for several months)   -lipid panel (02/2023): TG 89, Tchol 147, HDL 52, LDL 77 [crestor 10mg every other day + zetia 10mg qd]  -lipid panel (04/2022): , Tchol 137, HDL 46, LDL 71, nonHDL 91  -A1c (02/2023): 5.6%  -A1c (04/2022): 5.5%

## 2024-03-18 NOTE — PHYSICAL EXAM
[Well Developed] : well developed [Normal Conjunctiva] : normal conjunctiva [Well Nourished] : well nourished [No Carotid Bruit] : no carotid bruit [Normal] : normal S1, S2, no murmur, no rub, no gallop [Soft] : abdomen soft [Non Tender] : non-tender [No Edema] : no edema [Moves all extremities] : moves all extremities [Normal Speech] : normal speech [Alert and Oriented] : alert and oriented [Normal memory] : normal memory

## 2024-03-19 DIAGNOSIS — M17.0 BILATERAL PRIMARY OSTEOARTHRITIS OF KNEE: ICD-10-CM

## 2024-03-19 RX ORDER — MELOXICAM 15 MG/1
15 TABLET ORAL
Qty: 30 | Refills: 2 | Status: ACTIVE | COMMUNITY
Start: 2024-03-19 | End: 1900-01-01

## 2024-03-28 ENCOUNTER — TRANSCRIPTION ENCOUNTER (OUTPATIENT)
Age: 84
End: 2024-03-28

## 2024-04-02 ENCOUNTER — APPOINTMENT (OUTPATIENT)
Dept: HEART AND VASCULAR | Facility: CLINIC | Age: 84
End: 2024-04-02
Payer: MEDICARE

## 2024-04-02 PROCEDURE — 93306 TTE W/DOPPLER COMPLETE: CPT

## 2024-04-03 ENCOUNTER — TRANSCRIPTION ENCOUNTER (OUTPATIENT)
Age: 84
End: 2024-04-03

## 2024-04-08 ENCOUNTER — APPOINTMENT (OUTPATIENT)
Dept: PULMONOLOGY | Facility: CLINIC | Age: 84
End: 2024-04-08
Payer: MEDICARE

## 2024-04-08 VITALS
SYSTOLIC BLOOD PRESSURE: 140 MMHG | TEMPERATURE: 97.2 F | HEART RATE: 81 BPM | HEIGHT: 59 IN | BODY MASS INDEX: 29.03 KG/M2 | OXYGEN SATURATION: 97 % | DIASTOLIC BLOOD PRESSURE: 95 MMHG | RESPIRATION RATE: 81 BRPM | WEIGHT: 144 LBS

## 2024-04-08 DIAGNOSIS — J45.909 UNSPECIFIED ASTHMA, UNCOMPLICATED: ICD-10-CM

## 2024-04-08 DIAGNOSIS — R06.09 OTHER FORMS OF DYSPNEA: ICD-10-CM

## 2024-04-08 PROCEDURE — 99212 OFFICE O/P EST SF 10 MIN: CPT | Mod: 25

## 2024-04-08 PROCEDURE — 94010 BREATHING CAPACITY TEST: CPT

## 2024-04-08 PROCEDURE — 71046 X-RAY EXAM CHEST 2 VIEWS: CPT

## 2024-04-08 NOTE — ASSESSMENT
[FreeTextEntry1] : CXR done in office today which looks largely the same. Spirometry consistent with prior with moderate restriction in the setting of her known kyphosis and scoliosis. TTE wnl. Would recommend follow up with GI as she has not seen them in 2 years since her hiatal hernia. From her asthma perspective, continue with current inhalers and Fasenra.   RTC with Dr. Lees in 6 months

## 2024-04-08 NOTE — HISTORY OF PRESENT ILLNESS
[Never] : never [TextBox_4] : Mrs. Leal presents today for follow up. She says she has nausea and lower throat irritation that has been coming and going for about 6 weeks. No exacerbating or remitting factors. She does not use any reflux medication. She says shes getting a little more out of breath than usual. She hardly ever uses her rescue inhaler.

## 2024-04-08 NOTE — REVIEW OF SYSTEMS
[Sore Throat] : sore throat [Nasal Congestion] : nasal congestion [Postnasal Drip] : postnasal drip [SOB on Exertion] : sob on exertion [Negative] : Endocrine

## 2024-05-02 ENCOUNTER — APPOINTMENT (OUTPATIENT)
Dept: RADIOLOGY | Facility: HOSPITAL | Age: 84
End: 2024-05-02
Payer: MEDICARE

## 2024-05-02 ENCOUNTER — OUTPATIENT (OUTPATIENT)
Dept: OUTPATIENT SERVICES | Facility: HOSPITAL | Age: 84
LOS: 1 days | End: 2024-05-02
Payer: MEDICARE

## 2024-05-02 DIAGNOSIS — R13.19 OTHER DYSPHAGIA: ICD-10-CM

## 2024-05-02 DIAGNOSIS — Z95.1 PRESENCE OF AORTOCORONARY BYPASS GRAFT: Chronic | ICD-10-CM

## 2024-05-02 DIAGNOSIS — Z98.890 OTHER SPECIFIED POSTPROCEDURAL STATES: Chronic | ICD-10-CM

## 2024-05-02 PROCEDURE — 74240 X-RAY XM UPR GI TRC 1CNTRST: CPT | Mod: 26

## 2024-05-02 PROCEDURE — 74246 X-RAY XM UPR GI TRC 2CNTRST: CPT

## 2024-05-02 PROCEDURE — 74240 X-RAY XM UPR GI TRC 1CNTRST: CPT

## 2024-05-02 PROCEDURE — 74220 X-RAY XM ESOPHAGUS 1CNTRST: CPT

## 2024-05-20 RX ORDER — FLUVASTATIN SODIUM 80 MG/1
80 TABLET, EXTENDED RELEASE ORAL
Qty: 90 | Refills: 3 | Status: ACTIVE | COMMUNITY
Start: 2023-07-03 | End: 1900-01-01

## 2024-05-21 ENCOUNTER — TRANSCRIPTION ENCOUNTER (OUTPATIENT)
Age: 84
End: 2024-05-21

## 2024-05-30 RX ORDER — EZETIMIBE 10 MG/1
10 TABLET ORAL
Qty: 90 | Refills: 1 | Status: ACTIVE | COMMUNITY
Start: 1900-01-01 | End: 1900-01-01

## 2024-09-03 ENCOUNTER — TRANSCRIPTION ENCOUNTER (OUTPATIENT)
Age: 84
End: 2024-09-03

## 2024-09-05 ENCOUNTER — TRANSCRIPTION ENCOUNTER (OUTPATIENT)
Age: 84
End: 2024-09-05

## 2024-09-10 ENCOUNTER — RX RENEWAL (OUTPATIENT)
Age: 84
End: 2024-09-10

## 2024-09-19 ENCOUNTER — TRANSCRIPTION ENCOUNTER (OUTPATIENT)
Age: 84
End: 2024-09-19

## 2024-09-20 ENCOUNTER — TRANSCRIPTION ENCOUNTER (OUTPATIENT)
Age: 84
End: 2024-09-20

## 2024-09-24 ENCOUNTER — TRANSCRIPTION ENCOUNTER (OUTPATIENT)
Age: 84
End: 2024-09-24

## 2024-09-25 ENCOUNTER — TRANSCRIPTION ENCOUNTER (OUTPATIENT)
Age: 84
End: 2024-09-25

## 2024-09-27 ENCOUNTER — TRANSCRIPTION ENCOUNTER (OUTPATIENT)
Age: 84
End: 2024-09-27

## 2024-10-07 ENCOUNTER — TRANSCRIPTION ENCOUNTER (OUTPATIENT)
Age: 84
End: 2024-10-07

## 2024-10-07 ENCOUNTER — APPOINTMENT (OUTPATIENT)
Dept: PULMONOLOGY | Facility: CLINIC | Age: 84
End: 2024-10-07
Payer: MEDICARE

## 2024-10-07 VITALS
SYSTOLIC BLOOD PRESSURE: 122 MMHG | TEMPERATURE: 97.9 F | HEIGHT: 59 IN | DIASTOLIC BLOOD PRESSURE: 77 MMHG | OXYGEN SATURATION: 96 % | HEART RATE: 84 BPM | WEIGHT: 144 LBS | BODY MASS INDEX: 29.03 KG/M2

## 2024-10-07 DIAGNOSIS — Z23 ENCOUNTER FOR IMMUNIZATION: ICD-10-CM

## 2024-10-07 PROCEDURE — 90662 IIV NO PRSV INCREASED AG IM: CPT

## 2024-10-07 PROCEDURE — G0008: CPT

## 2024-10-14 ENCOUNTER — TRANSCRIPTION ENCOUNTER (OUTPATIENT)
Age: 84
End: 2024-10-14

## 2024-10-29 ENCOUNTER — TRANSCRIPTION ENCOUNTER (OUTPATIENT)
Age: 84
End: 2024-10-29

## 2024-11-07 ENCOUNTER — APPOINTMENT (OUTPATIENT)
Dept: HEART AND VASCULAR | Facility: CLINIC | Age: 84
End: 2024-11-07
Payer: MEDICARE

## 2024-11-07 VITALS
OXYGEN SATURATION: 97 % | BODY MASS INDEX: 28.22 KG/M2 | TEMPERATURE: 97.2 F | DIASTOLIC BLOOD PRESSURE: 75 MMHG | SYSTOLIC BLOOD PRESSURE: 123 MMHG | HEART RATE: 79 BPM | WEIGHT: 140 LBS | HEIGHT: 59 IN

## 2024-11-07 DIAGNOSIS — R06.09 OTHER FORMS OF DYSPNEA: ICD-10-CM

## 2024-11-07 DIAGNOSIS — R73.03 PREDIABETES.: ICD-10-CM

## 2024-11-07 DIAGNOSIS — I10 ESSENTIAL (PRIMARY) HYPERTENSION: ICD-10-CM

## 2024-11-07 DIAGNOSIS — E78.5 HYPERLIPIDEMIA, UNSPECIFIED: ICD-10-CM

## 2024-11-07 PROCEDURE — 93000 ELECTROCARDIOGRAM COMPLETE: CPT

## 2024-11-07 PROCEDURE — G2211 COMPLEX E/M VISIT ADD ON: CPT

## 2024-11-07 PROCEDURE — 99215 OFFICE O/P EST HI 40 MIN: CPT

## 2024-11-11 ENCOUNTER — TRANSCRIPTION ENCOUNTER (OUTPATIENT)
Age: 84
End: 2024-11-11

## 2024-11-26 ENCOUNTER — TRANSCRIPTION ENCOUNTER (OUTPATIENT)
Age: 84
End: 2024-11-26

## 2024-11-27 ENCOUNTER — TRANSCRIPTION ENCOUNTER (OUTPATIENT)
Age: 84
End: 2024-11-27

## 2024-12-02 ENCOUNTER — TRANSCRIPTION ENCOUNTER (OUTPATIENT)
Age: 84
End: 2024-12-02

## 2024-12-06 ENCOUNTER — APPOINTMENT (OUTPATIENT)
Dept: HEART AND VASCULAR | Facility: CLINIC | Age: 84
End: 2024-12-06
Payer: MEDICARE

## 2024-12-06 VITALS
SYSTOLIC BLOOD PRESSURE: 143 MMHG | DIASTOLIC BLOOD PRESSURE: 92 MMHG | WEIGHT: 140 LBS | TEMPERATURE: 97.6 F | BODY MASS INDEX: 32.4 KG/M2 | HEIGHT: 55 IN | OXYGEN SATURATION: 96 % | HEART RATE: 83 BPM

## 2024-12-06 VITALS — SYSTOLIC BLOOD PRESSURE: 122 MMHG | DIASTOLIC BLOOD PRESSURE: 75 MMHG

## 2024-12-06 VITALS — SYSTOLIC BLOOD PRESSURE: 138 MMHG | DIASTOLIC BLOOD PRESSURE: 90 MMHG

## 2024-12-06 DIAGNOSIS — I10 ESSENTIAL (PRIMARY) HYPERTENSION: ICD-10-CM

## 2024-12-06 DIAGNOSIS — E78.5 HYPERLIPIDEMIA, UNSPECIFIED: ICD-10-CM

## 2024-12-06 DIAGNOSIS — I25.810 ATHEROSCLEROSIS OF CORONARY ARTERY BYPASS GRAFT(S) W/OUT ANGINA PECTORIS: ICD-10-CM

## 2024-12-06 DIAGNOSIS — R73.03 PREDIABETES.: ICD-10-CM

## 2024-12-06 PROCEDURE — 99214 OFFICE O/P EST MOD 30 MIN: CPT | Mod: 25

## 2024-12-06 PROCEDURE — 96372 THER/PROPH/DIAG INJ SC/IM: CPT

## 2024-12-06 RX ORDER — INCLISIRAN 284 MG/1.5ML
284 INJECTION, SOLUTION SUBCUTANEOUS
Refills: 0 | Status: COMPLETED | OUTPATIENT
Start: 2024-12-06

## 2024-12-06 RX ADMIN — INCLISIRAN 0 MG/1.5ML: 284 INJECTION, SOLUTION SUBCUTANEOUS at 00:00

## 2025-02-07 ENCOUNTER — TRANSCRIPTION ENCOUNTER (OUTPATIENT)
Age: 85
End: 2025-02-07

## 2025-03-19 ENCOUNTER — NON-APPOINTMENT (OUTPATIENT)
Age: 85
End: 2025-03-19

## 2025-03-19 ENCOUNTER — APPOINTMENT (OUTPATIENT)
Dept: PULMONOLOGY | Facility: CLINIC | Age: 85
End: 2025-03-19
Payer: MEDICARE

## 2025-03-19 VITALS
HEART RATE: 83 BPM | WEIGHT: 140 LBS | OXYGEN SATURATION: 96 % | DIASTOLIC BLOOD PRESSURE: 78 MMHG | TEMPERATURE: 97.6 F | BODY MASS INDEX: 32.4 KG/M2 | SYSTOLIC BLOOD PRESSURE: 149 MMHG | HEIGHT: 55 IN

## 2025-03-19 DIAGNOSIS — G47.00 INSOMNIA, UNSPECIFIED: ICD-10-CM

## 2025-03-19 PROCEDURE — 99204 OFFICE O/P NEW MOD 45 MIN: CPT

## 2025-03-19 PROCEDURE — G2211 COMPLEX E/M VISIT ADD ON: CPT

## 2025-03-24 ENCOUNTER — APPOINTMENT (OUTPATIENT)
Dept: HEART AND VASCULAR | Facility: CLINIC | Age: 85
End: 2025-03-24
Payer: MEDICARE

## 2025-03-24 PROCEDURE — 96372 THER/PROPH/DIAG INJ SC/IM: CPT

## 2025-03-24 RX ORDER — INCLISIRAN 284 MG/1.5ML
284 INJECTION, SOLUTION SUBCUTANEOUS
Qty: 0 | Refills: 0 | Status: COMPLETED | OUTPATIENT
Start: 2025-03-24

## 2025-03-24 RX ADMIN — INCLISIRAN 0 MG/1.5ML: 284 INJECTION, SOLUTION SUBCUTANEOUS at 00:00

## 2025-04-03 ENCOUNTER — NON-APPOINTMENT (OUTPATIENT)
Age: 85
End: 2025-04-03

## 2025-04-07 ENCOUNTER — APPOINTMENT (OUTPATIENT)
Dept: PULMONOLOGY | Facility: CLINIC | Age: 85
End: 2025-04-07
Payer: MEDICARE

## 2025-04-07 VITALS
HEIGHT: 55 IN | BODY MASS INDEX: 32.86 KG/M2 | HEART RATE: 82 BPM | WEIGHT: 142 LBS | OXYGEN SATURATION: 96 % | TEMPERATURE: 97.9 F | DIASTOLIC BLOOD PRESSURE: 77 MMHG | SYSTOLIC BLOOD PRESSURE: 128 MMHG

## 2025-04-07 PROCEDURE — 94010 BREATHING CAPACITY TEST: CPT

## 2025-04-07 PROCEDURE — 99214 OFFICE O/P EST MOD 30 MIN: CPT | Mod: 25

## 2025-04-07 PROCEDURE — 71046 X-RAY EXAM CHEST 2 VIEWS: CPT

## 2025-04-07 RX ORDER — ALBUTEROL SULFATE 90 UG/1
108 (90 BASE) AEROSOL, METERED RESPIRATORY (INHALATION) 4 TIMES DAILY
Qty: 1 | Refills: 11 | Status: ACTIVE | COMMUNITY
Start: 2025-04-07 | End: 1900-01-01

## 2025-06-02 ENCOUNTER — APPOINTMENT (OUTPATIENT)
Dept: HEART AND VASCULAR | Facility: CLINIC | Age: 85
End: 2025-06-02
Payer: MEDICARE

## 2025-06-02 VITALS
TEMPERATURE: 97.5 F | HEART RATE: 77 BPM | BODY MASS INDEX: 32.86 KG/M2 | SYSTOLIC BLOOD PRESSURE: 144 MMHG | HEIGHT: 55 IN | WEIGHT: 142 LBS | OXYGEN SATURATION: 96 % | DIASTOLIC BLOOD PRESSURE: 80 MMHG

## 2025-06-02 VITALS — DIASTOLIC BLOOD PRESSURE: 76 MMHG | SYSTOLIC BLOOD PRESSURE: 127 MMHG

## 2025-06-02 DIAGNOSIS — R73.03 PREDIABETES.: ICD-10-CM

## 2025-06-02 DIAGNOSIS — R06.09 OTHER FORMS OF DYSPNEA: ICD-10-CM

## 2025-06-02 DIAGNOSIS — E78.5 HYPERLIPIDEMIA, UNSPECIFIED: ICD-10-CM

## 2025-06-02 DIAGNOSIS — I10 ESSENTIAL (PRIMARY) HYPERTENSION: ICD-10-CM

## 2025-06-02 DIAGNOSIS — I25.810 ATHEROSCLEROSIS OF CORONARY ARTERY BYPASS GRAFT(S) W/OUT ANGINA PECTORIS: ICD-10-CM

## 2025-06-02 PROCEDURE — 93000 ELECTROCARDIOGRAM COMPLETE: CPT

## 2025-06-02 PROCEDURE — 99214 OFFICE O/P EST MOD 30 MIN: CPT

## 2025-07-23 ENCOUNTER — APPOINTMENT (OUTPATIENT)
Dept: PULMONOLOGY | Facility: CLINIC | Age: 85
End: 2025-07-23

## 2025-08-11 ENCOUNTER — TRANSCRIPTION ENCOUNTER (OUTPATIENT)
Age: 85
End: 2025-08-11

## 2025-09-08 RX ORDER — PREDNISONE 20 MG/1
20 TABLET ORAL
Qty: 10 | Refills: 3 | Status: ACTIVE | COMMUNITY
Start: 2025-09-08 | End: 1900-01-01

## 2025-09-12 RX ORDER — INCLISIRAN 284 MG/1.5ML
284 INJECTION, SOLUTION SUBCUTANEOUS
Qty: 1 | Refills: 0 | Status: ACTIVE | COMMUNITY
Start: 2025-09-12 | End: 1900-01-01